# Patient Record
Sex: MALE | Race: WHITE | NOT HISPANIC OR LATINO | Employment: FULL TIME | URBAN - METROPOLITAN AREA
[De-identification: names, ages, dates, MRNs, and addresses within clinical notes are randomized per-mention and may not be internally consistent; named-entity substitution may affect disease eponyms.]

---

## 2023-08-15 ENCOUNTER — OFFICE VISIT (OUTPATIENT)
Dept: FAMILY MEDICINE CLINIC | Facility: CLINIC | Age: 23
End: 2023-08-15
Payer: COMMERCIAL

## 2023-08-15 VITALS
DIASTOLIC BLOOD PRESSURE: 66 MMHG | TEMPERATURE: 98.4 F | OXYGEN SATURATION: 97 % | HEART RATE: 72 BPM | HEIGHT: 73 IN | SYSTOLIC BLOOD PRESSURE: 116 MMHG | RESPIRATION RATE: 20 BRPM | WEIGHT: 226.6 LBS | BODY MASS INDEX: 30.03 KG/M2

## 2023-08-15 DIAGNOSIS — G47.33 OSA (OBSTRUCTIVE SLEEP APNEA): ICD-10-CM

## 2023-08-15 DIAGNOSIS — F33.1 MODERATE EPISODE OF RECURRENT MAJOR DEPRESSIVE DISORDER: Primary | ICD-10-CM

## 2023-08-15 DIAGNOSIS — E66.3 OVERWEIGHT (BMI 25.0-29.9): ICD-10-CM

## 2023-08-15 DIAGNOSIS — F90.9 ATTENTION DEFICIT HYPERACTIVITY DISORDER (ADHD), UNSPECIFIED ADHD TYPE: ICD-10-CM

## 2023-08-15 DIAGNOSIS — F41.9 ANXIETY: ICD-10-CM

## 2023-08-15 PROBLEM — F32.A DEPRESSION: Status: ACTIVE | Noted: 2023-08-15

## 2023-08-15 PROBLEM — R73.01 IMPAIRED FASTING GLUCOSE: Status: ACTIVE | Noted: 2018-05-15

## 2023-08-15 PROBLEM — R53.82 CHRONIC FATIGUE: Status: RESOLVED | Noted: 2018-05-15 | Resolved: 2023-08-15

## 2023-08-15 PROBLEM — R63.5 WEIGHT GAIN: Status: ACTIVE | Noted: 2018-05-15

## 2023-08-15 PROBLEM — R53.82 CHRONIC FATIGUE: Status: ACTIVE | Noted: 2018-05-15

## 2023-08-15 PROCEDURE — 99204 OFFICE O/P NEW MOD 45 MIN: CPT | Performed by: FAMILY MEDICINE

## 2023-08-15 RX ORDER — DESVENLAFAXINE 100 MG/1
100 TABLET, EXTENDED RELEASE ORAL DAILY
Qty: 90 TABLET | Refills: 3 | Status: SHIPPED | OUTPATIENT
Start: 2023-08-15

## 2023-08-15 RX ORDER — LURASIDONE HYDROCHLORIDE 20 MG/1
20 TABLET, FILM COATED ORAL
COMMUNITY
End: 2023-08-15 | Stop reason: ALTCHOICE

## 2023-08-15 RX ORDER — DESVENLAFAXINE 100 MG/1
100 TABLET, EXTENDED RELEASE ORAL
COMMUNITY
End: 2023-08-15 | Stop reason: SDUPTHER

## 2023-08-15 RX ORDER — DESVENLAFAXINE 100 MG/1
1 TABLET, EXTENDED RELEASE ORAL DAILY
COMMUNITY
Start: 2023-07-06 | End: 2023-08-15 | Stop reason: SDUPTHER

## 2023-08-15 RX ORDER — MULTIPLE VITAMINS W/ MINERALS TAB 9MG-400MCG
TAB ORAL
COMMUNITY
End: 2023-08-15

## 2023-08-15 RX ORDER — LAMOTRIGINE 200 MG/1
200 TABLET ORAL
COMMUNITY
End: 2023-08-15 | Stop reason: ALTCHOICE

## 2023-08-15 NOTE — ASSESSMENT & PLAN NOTE
Patient's (Body mass index is 29.87 kg/mý.) indicates that they are overweight with health conditions that include obstructive sleep apnea . Weight is improving with lifestyle modifications. BMI is is above average; BMI management plan is completed. We discussed low calorie, low carb based diet program, portion control, and increasing exercise.

## 2023-08-15 NOTE — PROGRESS NOTES
Devon Carroll is a 22 y.o. male who presents today to establish care.    Chief Complaint   Patient presents with    Establish Care    referral     For psychiatrist         Patient is here to establish care. His previous provider was in [place] and he moved here two months ago. He is being treated for anxeity, depression, and ADHD. He would like referral to get established with behavioral health here. He had had some worsening or depression and anxiety but it has been improving pristiq and change in jobs. He has been diagnosed with sleep apnea and would like to see sleep medicine to discuss a new device to help with sleep apnea as the CPAP mask made him throw up. He has no acute complaints today.        Review of Systems   Constitutional:  Negative for fever and unexpected weight loss.   HENT:  Negative for congestion, ear pain and sore throat.    Eyes:  Negative for visual disturbance.   Respiratory:  Negative for cough, shortness of breath and wheezing.    Cardiovascular:  Negative for chest pain and palpitations.   Gastrointestinal:  Negative for abdominal pain, blood in stool, constipation, diarrhea, nausea, vomiting and GERD.   Endocrine: Negative for polydipsia and polyuria.   Genitourinary:  Negative for difficulty urinating.   Musculoskeletal:  Negative for joint swelling.   Skin:  Negative for rash and skin lesions.   Allergic/Immunologic: Negative for environmental allergies.   Neurological:  Negative for seizures and syncope.   Hematological:  Does not bruise/bleed easily.   Psychiatric/Behavioral:  Negative for decreased concentration, dysphoric mood, sleep disturbance, suicidal ideas, negative for hyperactivity and depressed mood. The patient is not nervous/anxious.       PHQ-9 Depression Screening  Little interest or pleasure in doing things? 0-->not at all   Feeling down, depressed, or hopeless? 0-->not at all   Trouble falling or staying asleep, or sleeping too much?     Feeling tired or having little  energy?     Poor appetite or overeating?     Feeling bad about yourself - or that you are a failure or have let yourself or your family down?     Trouble concentrating on things, such as reading the newspaper or watching television?     Moving or speaking so slowly that other people could have noticed? Or the opposite - being so fidgety or restless that you have been moving around a lot more than usual?     Thoughts that you would be better off dead, or of hurting yourself in some way?     PHQ-9 Total Score 0   If you checked off any problems, how difficult have these problems made it for you to do your work, take care of things at home, or get along with other people?       ASHLEY-7  Over the last two weeks, how often have you been bothered by the following problems?  Feeling nervous, anxious or on edge: 1  Not being able to stop or control worryin  Worrying too much about different things: 0  Trouble Relaxin  Being so restless that it is hard to sit still: 0  Becoming easily annoyed or irritable: 0  Feeling afraid as if something awful might happen: 0  ASHLEY 7 Total Score: 3  If you checked any problems, how difficult have these problems made it for you to do your work, take care of things at home, or get along with other people: Not difficult at all      Past Medical History:   Diagnosis Date    ADHD (attention deficit hyperactivity disorder) 10/12/2005        Past Surgical History:   Procedure Laterality Date    CYST REMOVAL      MANDIBLE SURGERY  2021    to correct underbite    WISDOM TOOTH EXTRACTION      [id number]        Family History   Problem Relation Age of Onset    Irritable bowel syndrome Mother     Thyroid disease Mother     Other Father         congenital heart disease    Other Maternal Grandmother         unknown    Other Maternal Grandfather         unknown    Heart disease Paternal Grandmother     Arthritis Paternal Grandfather     Anxiety disorder Paternal Aunt     Bipolar disorder Maternal  "Aunt         Social History     Socioeconomic History    Marital status: Single   Tobacco Use    Smoking status: Never    Smokeless tobacco: Never   Substance and Sexual Activity    Alcohol use: Yes     Alcohol/week: 0.0 - 10.0 standard drinks     Comment: I drink socially. Not every week.    Drug use: Never    Sexual activity: Yes     Partners: Male     Birth control/protection: Same-sex partner, None     Comment: 2 sexual partners in the last year        Current Outpatient Medications on File Prior to Visit   Medication Sig Dispense Refill    [DISCONTINUED] desvenlafaxine (PRISTIQ) 100 MG 24 hr tablet Take 1 tablet by mouth Daily.      [DISCONTINUED] desvenlafaxine (PRISTIQ) 100 MG 24 hr tablet Take 1 tablet by mouth.      [DISCONTINUED] lamoTRIgine (LaMICtal) 200 MG tablet Take 1 tablet by mouth. (Patient not taking: Reported on 8/15/2023)      [DISCONTINUED] Lurasidone HCl (LATUDA) 20 MG tablet tablet Take 1 tablet by mouth. (Patient not taking: Reported on 8/15/2023)      [DISCONTINUED] multivitamin with minerals tablet tablet Take  by mouth. (Patient not taking: Reported on 8/15/2023)       No current facility-administered medications on file prior to visit.       No Known Allergies     Visit Vitals  /66 (BP Location: Right arm, Patient Position: Sitting)   Pulse 72   Temp 98.4 øF (36.9 øC) (Temporal)   Resp 20   Ht 185.5 cm (73.03\")   Wt 103 kg (226 lb 9.6 oz)   SpO2 97%   BMI 29.87 kg/mý      Body mass index is 29.87 kg/mý.    Physical Exam  Constitutional:       General: He is not in acute distress.     Appearance: He is well-developed. He is not diaphoretic.   HENT:      Head: Atraumatic.   Cardiovascular:      Rate and Rhythm: Normal rate and regular rhythm.      Heart sounds: Normal heart sounds. No murmur heard.    No friction rub. No gallop.   Pulmonary:      Effort: Pulmonary effort is normal. No respiratory distress.      Breath sounds: Normal breath sounds. No stridor. No wheezing, rhonchi or " rales.   Musculoskeletal:      Cervical back: Normal range of motion and neck supple.   Skin:     General: Skin is warm and dry.   Neurological:      Mental Status: He is alert and oriented to person, place, and time.   Psychiatric:         Behavior: Behavior normal.        No results found for this or any previous visit.     Problems Addressed this Visit          Endocrine and Metabolic    Overweight (BMI 25.0-29.9)     Patient's (Body mass index is 29.87 kg/mý.) indicates that they are overweight with health conditions that include obstructive sleep apnea . Weight is improving with lifestyle modifications. BMI is is above average; BMI management plan is completed. We discussed low calorie, low carb based diet program, portion control, and increasing exercise.             Mental Health    Depression - Primary     Patient's depression is recurrent and is moderate without psychosis. Their depression is currently in partial remission and the condition is improving with treatment. This will be reassessed in 3 months. F/U as described:patient will continue current medication therapy and was given referral to behavioral health.         Relevant Medications    desvenlafaxine (PRISTIQ) 100 MG 24 hr tablet    Other Relevant Orders    Ambulatory Referral to Behavioral Health    Anxiety     Chronic and stable.  Patient feels the symptoms are well controlled with Pristiq.  He will continue this medication.  Patient was given referral to behavioral health.         Relevant Medications    desvenlafaxine (PRISTIQ) 100 MG 24 hr tablet    Other Relevant Orders    Ambulatory Referral to Behavioral Health    ADHD (attention deficit hyperactivity disorder)     Chronic and stable.  Patient has not taken any medicine recently for ADHD control.  Patient was given referral to behavioral health for further evaluation and treatment.         Relevant Medications    desvenlafaxine (PRISTIQ) 100 MG 24 hr tablet    Other Relevant Orders     Ambulatory Referral to Behavioral Health       Sleep    NANI (obstructive sleep apnea)     Chronic.  Patient did not tolerate CPAP due to it causing him to vomit.  He would like to try another appliance to try to get control of sleep apnea.  Patient was given referral to sleep medicine for further evaluation and treatment.         Relevant Orders    Ambulatory Referral to Sleep Medicine     Diagnoses         Codes Comments    Moderate episode of recurrent major depressive disorder    -  Primary ICD-10-CM: F33.1  ICD-9-CM: 296.32     Anxiety     ICD-10-CM: F41.9  ICD-9-CM: 300.00     Attention deficit hyperactivity disorder (ADHD), unspecified ADHD type     ICD-10-CM: F90.9  ICD-9-CM: 314.01     NANI (obstructive sleep apnea)     ICD-10-CM: G47.33  ICD-9-CM: 327.23     Overweight (BMI 25.0-29.9)     ICD-10-CM: E66.3  ICD-9-CM: 278.02             Return in about 3 months (around 11/16/2023) for Annual.    Anibal Leach MD  8/15/2023

## 2023-08-15 NOTE — ASSESSMENT & PLAN NOTE
Chronic and stable.  Patient feels the symptoms are well controlled with Pristiq.  He will continue this medication.  Patient was given referral to behavioral health.

## 2023-08-15 NOTE — PATIENT INSTRUCTIONS

## 2023-08-15 NOTE — ASSESSMENT & PLAN NOTE
Chronic and stable.  Patient has not taken any medicine recently for ADHD control.  Patient was given referral to behavioral health for further evaluation and treatment.

## 2023-08-15 NOTE — ASSESSMENT & PLAN NOTE
Chronic.  Patient did not tolerate CPAP due to it causing him to vomit.  He would like to try another appliance to try to get control of sleep apnea.  Patient was given referral to sleep medicine for further evaluation and treatment.

## 2023-08-15 NOTE — ASSESSMENT & PLAN NOTE
Patient's depression is recurrent and is moderate without psychosis. Their depression is currently in partial remission and the condition is improving with treatment. This will be reassessed in 3 months. F/U as described:patient will continue current medication therapy and was given referral to behavioral health.

## 2023-09-25 ENCOUNTER — OFFICE VISIT (OUTPATIENT)
Age: 23
End: 2023-09-25

## 2023-09-25 VITALS
OXYGEN SATURATION: 99 % | DIASTOLIC BLOOD PRESSURE: 80 MMHG | HEART RATE: 56 BPM | SYSTOLIC BLOOD PRESSURE: 124 MMHG | HEIGHT: 73 IN | BODY MASS INDEX: 30.48 KG/M2 | WEIGHT: 230 LBS

## 2023-09-25 DIAGNOSIS — F41.1 GENERALIZED ANXIETY DISORDER: ICD-10-CM

## 2023-09-25 DIAGNOSIS — F33.1 MAJOR DEPRESSIVE DISORDER, RECURRENT EPISODE, MODERATE DEGREE: Primary | ICD-10-CM

## 2023-09-25 NOTE — PROGRESS NOTES
New Patient Office Visit      Date: 2023  Patient Name: Devon Carroll  : 2000   MRN: [id number]     Referring Provider: Anibal Leach MD    Chief Complaint:      ICD-10-CM ICD-9-CM   1. Major depressive disorder, recurrent episode, moderate degree  F33.1 296.32   2. Generalized anxiety disorder  F41.1 300.02        History of Present Illness:   Devon Carroll is a 22 y.o. male who is here today for intake and medication management.     Patient is seen and evaluated in the office.  He is well-groomed and appears to be in no acute distress.  He is calm and cooperative with evaluation.  He exhibits a linear and goal-directed thought process.  Patient states that he moved to [place] from [place] at the end of May of this year.  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  Patient states that although he enjoys his job, he has already had a lot of issues there with the culture of the company.  It seems as though this company is a toxic environment for him.  Patient is also not made many friends in [place].  All of his family and friends are back in [place].  He has been able to make a few visits to [place] to see friends and family, but is not enjoying living here so far.  Patient describes his mood as grumpy, irritated, and stressed if he does not take his Pristiq.  He states that he has been on Pristiq since high school.  Prior to starting Pristiq, he had taken several other medications that did not work for him.  He states that Pristiq has been fine.  Patient has followed up with a psychiatrist every 6 months to manage pression and anxiety.  Lately, he states that his mood has not been great, but attributes this mostly to situation.  He states that anxiety has increased due to issues at work.  Sleep is not good, but has improved with use of melatonin.  He states that he has been over eating, and has not been able to find the motivation to begin working out. He denies any  SI/intent/plan. Patient denies symptoms of kathy, auditory or visual hallucinations. He does not elicit any signs of psychosis. He denies paranoia or ideas of references.     Patient recently saw PCP who prescribed his Pristiq with several refills.  He denies need for any medication adjustments to address mood or anxiety today.  He is not interested in therapy as he feels as though therapy has never helped him in the past.  Patient will follow back up in 6 months.    Subjective      Review of Systems:   Review of Systems   Constitutional:  Negative for chills, fatigue and fever.   HENT:  Negative for congestion, hearing loss, sore throat and trouble swallowing.    Eyes:  Negative for blurred vision and double vision.   Respiratory:  Negative for cough, chest tightness and shortness of breath.    Cardiovascular:  Negative for chest pain and palpitations.   Gastrointestinal:  Negative for abdominal pain, constipation, diarrhea, nausea and vomiting.   Endocrine: Negative for polydipsia and polyuria.   Genitourinary:  Negative for hematuria and urgency.   Musculoskeletal:  Negative for arthralgias.   Skin:  Negative for skin lesions and bruise.   Neurological:  Negative for dizziness, tremors, seizures and light-headedness.   Hematological:  Negative for adenopathy.     Screening Scores:   PHQ-9 : 8  ASHLEY-7 : 4    Past Psychiatric History:   History of outpatient psychiatrist: In [place]   History of outpatient therapy: has had a few therapist in the past  Previous Inpatient hospitalizations: twice; depression/anxiety  Previous medication trials: Latuda (ineffective and caused weight gain), Strattera for ADHD: has tried others that didn't help  History of suicide/self harm attempts: denies     Abuse/trauma History:              Physical: denies              Sexual: denies              Emotional/Neglect: denies              Significant death/loss: denies              Other trauma: denies                Substance Abuse  "History:              Alcohol: denies              Tobacco/Vape: denies              Illicit Drugs: denies                Legal History:   denies     Social History:  Where was patient born: [place]   Where does patient currently live: [place]  Highest level of education obtained: College  Living situation: lives alone  Patient's Occupation: Landscape architecture   Leisure and Recreation: [employer]   Support system: in [place]  Oriental orthodox: denies     Family History:   Family History   Problem Relation Age of Onset    Irritable bowel syndrome Mother     Thyroid disease Mother     Other Father         congenital heart disease    Other Maternal Grandmother         unknown    Other Maternal Grandfather         unknown    Heart disease Paternal Grandmother     Arthritis Paternal Grandfather     Anxiety disorder Paternal Aunt     Bipolar disorder Maternal Aunt        Psychiatric History:   Psych Diagnosis: Paternal aunts and cousin with anxiety, Maternal aunt with bipolar  History of suicide/self harm attempts: denies  History of Substance abuse: denies      Past Medical History:   Past Medical History:   Diagnosis Date    ADHD (attention deficit hyperactivity disorder) 10/12/2005       Past Surgical History:   Past Surgical History:   Procedure Laterality Date    CYST REMOVAL      MANDIBLE SURGERY  08/2021    to correct underbite    WISDOM TOOTH EXTRACTION      [id number]       Medications:     Current Outpatient Medications:     desvenlafaxine (PRISTIQ) 100 MG 24 hr tablet, Take 1 tablet by mouth Daily., Disp: 90 tablet, Rfl: 3    Medication Considerations:  CONTRERAS reviewed and appropriate.      Allergies:   No Known Allergies      Objective     Physical Exam:  Vital Signs:   Vitals:    09/25/23 1303   BP: 124/80   Pulse: 56   SpO2: 99%   Weight: 104 kg (230 lb)   Height: 185.6 cm (73.07\")     Body mass index is 30.29 kg/m².     Mental Status Exam:   MENTAL STATUS EXAM   General Appearance:  Cleanly groomed " and dressed  Eye Contact:  Good eye contact  Attitude:  Cooperative  Motor Activity:  Normal gait, posture  Muscle Strength:  Normal  Speech:  Normal rate, tone, volume  Language:  Spontaneous  Mood and affect:  Anxious  Hopelessness:  5  Thought Process:  Logical and goal-directed  Associations/ Thought Content:  No delusions  Hallucinations:  None  Suicidal Ideations:  Not present  Homicidal Ideation:  Not present  Sensorium:  Alert  Orientation:  Person, place, time and situation  Immediate Recall, Recent, and Remote Memory:  Intact  Attention Span/ Concentration:  Good  Fund of Knowledge:  Appropriate for age and educational level  Intellectual Functioning:  Average range  Insight:  Fair  Judgement:  Fair  Reliability:  Fair  Impulse Control:  Fair     SUICIDE RISK ASSESSMENT/CSSRS:  1. Does patient have thoughts of suicide? denies  2. Does patient have intent for suicide? denies  3. Does patient have a current plan for suicide? denies  4. History of suicide attempts: denies  5. Family history of suicide or attempts: denies  6. History of violent behaviors towards others or property or thoughts of committing suicide: denies  7. History of sexual aggression toward others: denies  8. Access to firearms or weapons: denies    Assessment / Plan      Visit Diagnosis/Orders Placed This Visit:  Diagnoses and all orders for this visit:    1. Major depressive disorder, recurrent episode, moderate degree (Primary)  2. Generalized anxiety disorder  Continue Pristiq 100 mg po daily  Follow up with writer in 6 mo  Patient not interested in therapy at this time    Labs Reviewed : labs from 1/18/23 reviewed  Chart Reviewed     Functional Status: Mild impairment     Prognosis: Good with Ongoing Treatment     Impression/Formulation:  Patient appeared alert and oriented.  Patient is voluntarily requesting to begin outpatient treatment at [facility] [address].  Patient is receptive to assistance with  maintaining a stable lifestyle.  Patient presents with history of     ICD-10-CM ICD-9-CM   1. Major depressive disorder, recurrent episode, moderate degree  F33.1 296.32   2. Generalized anxiety disorder  F41.1 300.02   .     Treatment Plan:     Patient will continue supportive psychotherapy efforts and medications as indicated. Clinic will obtain release of information for current treatment team for continuity of care as needed. Patient will contact this office, call 911 or present to the nearest emergency room should suicidal or homicidal ideations occur. Discussed medication options and treatment plan of prescribed medication(s) as well as the risks, benefits, and potential side effects. Patient ackowledged and verbally consented to continue with current treatment plan and was educated on the importance of compliance with treatment and follow-up appointments.     Follow Up:   Return in about 6 months (around 3/25/2024) for Medication Management.    Quality Measures:   Tobacco: Patient denies use of tobacco and declines need for further education regarding use/cessation.    Brynn Tao MD   [facility] [address]     This is electronically signed by Brynn Tao MD  09/25/2023 12:52 EDT

## 2023-10-18 ENCOUNTER — TELEMEDICINE (OUTPATIENT)
Dept: SLEEP MEDICINE | Facility: CLINIC | Age: 23
End: 2023-10-18
Payer: COMMERCIAL

## 2023-10-18 VITALS — BODY MASS INDEX: 27.6 KG/M2 | WEIGHT: 222 LBS | HEIGHT: 75 IN

## 2023-10-18 DIAGNOSIS — R06.83 SNORING: ICD-10-CM

## 2023-10-18 DIAGNOSIS — G47.33 OSA (OBSTRUCTIVE SLEEP APNEA): Primary | ICD-10-CM

## 2023-10-18 PROCEDURE — 99203 OFFICE O/P NEW LOW 30 MIN: CPT | Performed by: NURSE PRACTITIONER

## 2023-10-18 NOTE — PROGRESS NOTES
Chief Complaint:   Chief Complaint   Patient presents with    Sleeping Problem       HPI:    Devon Carroll is a 22 y.o. male here to establish care.  Patient sees  as primary care.  Patient does have a history of ADHD, anxiety, depression and sleep apnea.    Patient states he was originally diagnosed in 2017 at a hospital close to [facility].  Patient states he does not have his report and the doctor has since retired.  We will try to get this if possible but likely need to repeat.  Patient has not worn his CPAP in over a year.  He does have symptoms of snoring and witnessed apneas.  Patient has no history of nasal fracture, hypnagogic hallucinations or sleep paralysis.  He did have a concussion as a child.  Patient states he stopped using as it was bulky, unattractive, and he has a single man.    During the weekday he will go to bed at 10:30 PM and awaken 7:20 AM.  Patient on the weekend going to bed at 1 AM awakening 10 AM.  He estimates getting 7 to 8 hours of sleep nightly.  He will go to sleep within 30 to 60 minutes.  Patient does feel rested upon awakening.  He does notice some movement in the night but nothing that will awaken him.  Patient has an Devens score of 2/24.    We did speak today about the consequences of untreated sleep apnea such as hypertension, atrial fibrillation, stroke, sudden cardiac death or early onset that dementia.  Also discussed different therapies available to him such as CPAP, MAD, or ENT referral.  Patient states he has had jaw surgery to correct an underbite in the past and is orthodontist is worried MAD would not be beneficial and may reposition his jaw.    Social history  This is a very pleasant 22-year-old male.  Patient is a non-smoker and will drink alcohol 2-3 times a week.  Patient denies illicit substance use.  He will usually have 1-2 regular tatiana daily.    Family History   Problem Relation Age of Onset    Irritable bowel syndrome Mother     Thyroid  disease Mother     Other Father         congenital heart disease    Other Maternal Grandmother         unknown    Other Maternal Grandfather         unknown    Heart disease Paternal Grandmother     Arthritis Paternal Grandfather     Anxiety disorder Paternal Aunt     Bipolar disorder Maternal Aunt      Past Surgical History:   Procedure Laterality Date    CYST REMOVAL      MANDIBLE SURGERY  08/2021    to correct underbite    WISDOM TOOTH EXTRACTION      [id number]         Current medications are:   Current Outpatient Medications:     desvenlafaxine (PRISTIQ) 100 MG 24 hr tablet, Take 1 tablet by mouth Daily., Disp: 90 tablet, Rfl: 3.      The patient's relevant past medical, surgical, family and social history were reviewed and updated in Epic as appropriate.       Review of Systems   Respiratory:  Positive for apnea.    Psychiatric/Behavioral:  Positive for decreased concentration, dysphoric mood and sleep disturbance. The patient is nervous/anxious.    All other systems reviewed and are negative.        Objective:    Physical Exam  Constitutional:       Appearance: Normal appearance.   HENT:      Head: Normocephalic and atraumatic.   Pulmonary:      Effort: Pulmonary effort is normal. No respiratory distress.   Neurological:      Mental Status: He is alert and oriented to person, place, and time.   Psychiatric:         Mood and Affect: Mood normal.         Behavior: Behavior normal.         Thought Content: Thought content normal.         Judgment: Judgment normal.             ASSESSMENT/PLAN    Diagnoses and all orders for this visit:    1. NANI (obstructive sleep apnea) (Primary)  -     Home Sleep Study; Future    2. Snoring  -     Home Sleep Study; Future        Counseled patient regarding multimodal approach with healthy nutrition, healthy sleep, regular physical activity, social activities, counseling, and medications. Encouraged to practice lateral sleep position. Avoid alcohol and sedatives close to  bedtime.  The patient's history of sleep apnea and consequences of untreated sleep apnea and her inability to get his testing we will repeat HST and follow-up as appropriate.  Patient gave verbal consent for video visit.      I have reviewed the results of my evaluation and impression and discussed my recommendations in detail with the patient.      Signed by  Akua Catherine, APRN    October 18, 2023      CC: Anibal Leach MD Koehler, Kendell D, MD

## 2023-12-04 ENCOUNTER — HOSPITAL ENCOUNTER (OUTPATIENT)
Dept: SLEEP MEDICINE | Facility: HOSPITAL | Age: 23
End: 2023-12-04
Payer: COMMERCIAL

## 2023-12-04 VITALS — HEIGHT: 75 IN | BODY MASS INDEX: 27.58 KG/M2 | WEIGHT: 221.78 LBS

## 2023-12-04 DIAGNOSIS — G47.33 OSA (OBSTRUCTIVE SLEEP APNEA): ICD-10-CM

## 2023-12-04 DIAGNOSIS — R06.83 SNORING: ICD-10-CM

## 2023-12-04 PROCEDURE — 95806 SLEEP STUDY UNATT&RESP EFFT: CPT

## 2023-12-06 DIAGNOSIS — G47.33 OSA (OBSTRUCTIVE SLEEP APNEA): Primary | ICD-10-CM

## 2023-12-06 DIAGNOSIS — R06.83 SNORING: ICD-10-CM

## 2023-12-13 ENCOUNTER — TELEMEDICINE (OUTPATIENT)
Dept: SLEEP MEDICINE | Facility: CLINIC | Age: 23
End: 2023-12-13
Payer: COMMERCIAL

## 2023-12-13 DIAGNOSIS — G47.33 OSA (OBSTRUCTIVE SLEEP APNEA): Primary | ICD-10-CM

## 2023-12-13 PROCEDURE — 99213 OFFICE O/P EST LOW 20 MIN: CPT | Performed by: NURSE PRACTITIONER

## 2023-12-13 NOTE — PROGRESS NOTES
Chief Complaint:   Chief Complaint   Patient presents with    Follow-up       HPI:    Devon Carroll is a 23 y.o. male here for follow-up of sleep study results.    Patient sees  as primary care.  Patient does have a history of ADHD, anxiety, depression and sleep apnea.  We did see him in consult 10/18/2023.     Patient stated he was originally diagnosed in 2017 at a hospital close to [facility].  Patient stated he does not have his report and the doctor has since retired.  We will try to get this if possible but likely need to repeat.  Patient has not worn his CPAP in over a year.  He does have symptoms of snoring and witnessed apneas.  Patient has no history of nasal fracture, hypnagogic hallucinations or sleep paralysis.  He did have a concussion as a child.  Patient states he stopped using as it was bulky, unattractive, and he has a single man.  Patient had a sleep study 12/4/2023 that did show an AHI of 11.4.  Patient states he does not wish to try CPAP again due to his age and single status.  This also caused difficulties with bloating and excess air.  Patient is interested in [facility] I will get an order mailed out to him today.       Current medications are:   Current Outpatient Medications:     desvenlafaxine (PRISTIQ) 100 MG 24 hr tablet, Take 1 tablet by mouth Daily., Disp: 90 tablet, Rfl: 3.      The patient's relevant past medical, surgical, family and social history were reviewed and updated in Epic as appropriate.       Review of Systems   Respiratory:  Positive for apnea.    Psychiatric/Behavioral:  Positive for dysphoric mood and sleep disturbance. The patient is nervous/anxious.    All other systems reviewed and are negative.        Objective:    Physical Exam  Constitutional:       Appearance: Normal appearance.   HENT:      Head: Normocephalic and atraumatic.   Pulmonary:      Effort: Pulmonary effort is normal. No respiratory distress.   Neurological:      Mental Status: He is alert  and oriented to person, place, and time.   Psychiatric:         Mood and Affect: Mood normal.         Behavior: Behavior normal.         Thought Content: Thought content normal.         Judgment: Judgment normal.           ASSESSMENT/PLAN    Diagnoses and all orders for this visit:    1. NANI (obstructive sleep apnea) (Primary)  -     Mandible Advancing [employer]        Counseled patient regarding multimodal approach with healthy nutrition, healthy sleep, regular physical activity, social activities, counseling, and medications. Encouraged to practice lateral sleep position. Avoid alcohol and sedatives close to bedtime.    I will do an order for MAT for the attendance of his choice I will mail this to him today and see him back in 4 months.  Patient agrees to this plan of care.  Patient is at home in [place] and gave consent for video visit.    I have reviewed the results of my evaluation and impression and discussed my recommendations in detail with the patient.      Signed by  STANTON Shepherd    December 13, 2023      CC: Anibal Leach MD         No ref. provider found

## 2023-12-18 ENCOUNTER — OFFICE VISIT (OUTPATIENT)
Age: 23
End: 2023-12-18
Payer: COMMERCIAL

## 2023-12-18 VITALS
WEIGHT: 232.2 LBS | OXYGEN SATURATION: 99 % | DIASTOLIC BLOOD PRESSURE: 92 MMHG | SYSTOLIC BLOOD PRESSURE: 140 MMHG | HEIGHT: 75 IN | HEART RATE: 69 BPM | BODY MASS INDEX: 28.87 KG/M2

## 2023-12-18 DIAGNOSIS — F41.1 GENERALIZED ANXIETY DISORDER: ICD-10-CM

## 2023-12-18 DIAGNOSIS — F33.1 MAJOR DEPRESSIVE DISORDER, RECURRENT EPISODE, MODERATE DEGREE: Primary | ICD-10-CM

## 2023-12-18 NOTE — PROGRESS NOTES
[facility]             Follow Up Office Visit      Date: 2023   Patient Name: Devon Carroll  : 2000   MRN: [id number]     Referring Provider: Anibal Leach MD    Chief Complaint:      ICD-10-CM ICD-9-CM   1. Major depressive disorder, recurrent episode, moderate degree  F33.1 296.32   2. Generalized anxiety disorder  F41.1 300.02        History of Present Illness:   Devon Carroll is a 23 y.o. male who is here today for follow up with MDD/ASHLEY.    Patient is seen and evaluated in the office.  He appears to be in no acute distress at this time.  He is calm and cooperative with evaluation, and exhibits a linear and goal-directed thought process.  Patient came into clinic last week after having a difficult time.  He was able to speak with therapist in clinic and scheduled a sooner appointment to follow-up with writer.  Patient speaks with writer regarding things that have been going on around that time.  He has been having a difficult time in a relationship, and found that his partner did not have his best interest in mind.  He has also continued to have a hard time at work due to it being a toxic environment and his direct boss being unfair and unforgiving.  Since last week, patient states that he was able to go back to [place] to visit his family.  After being able to regroup, patient states that he is feeling a little bit better.  He states that overall he feels as though Pristiq has been beneficial for him.  This past episode was mainly situational, patient does not feel as though it warrants any adjustments to medications at this time.  Patient has made plans to change his situation.  He is hopeful to find a job back in [place] area within the next few months, so that he can move back to be closer to family and friends.  We talked about this and using current situation to learn more about things that he does not want in a job or in a relationship in  "the future.  We talked about always working towards getting things in her life that we deserve and that are mentally good for us.  Writer encouraged patient to use last couple of months in [place] to enjoy things and experience new things.  Patient is agreeable.  He denies any suicidal ideation, plan, intent.  We will keep medications the same today, and follow-up in March.  If patient needs writer sooner, he will reach out the office and schedule sooner appointment.    Previous Medication Trials:  Latuda (ineffective and caused weight gain), Strattera for ADHD: has tried others that didn't help     Subjective      Review of Systems:   Review of Systems   Constitutional:  Negative for chills, fatigue and fever.   HENT:  Negative for congestion, hearing loss, sore throat and trouble swallowing.    Eyes:  Negative for blurred vision and double vision.   Respiratory:  Negative for cough, chest tightness and shortness of breath.    Cardiovascular:  Negative for chest pain and palpitations.   Gastrointestinal:  Negative for abdominal pain, constipation, diarrhea, nausea and vomiting.   Endocrine: Negative for polydipsia and polyuria.   Genitourinary:  Negative for hematuria and urgency.   Musculoskeletal:  Negative for arthralgias.   Skin:  Negative for skin lesions and bruise.   Neurological:  Negative for dizziness, tremors, seizures and light-headedness.   Hematological:  Negative for adenopathy.     Screening Scores:   PHQ-9 : 7  ASHLEY-7 : 3    Medications:     Current Outpatient Medications:     desvenlafaxine (PRISTIQ) 100 MG 24 hr tablet, Take 1 tablet by mouth Daily., Disp: 90 tablet, Rfl: 3    Medication Considerations:  CONTRERAS reviewed and appropriate.     Allergies:   No Known Allergies    Objective     Vital Signs:   Vitals:    12/18/23 1145   BP: 140/92   Pulse: 69   SpO2: 99%   Weight: 105 kg (232 lb 3.2 oz)   Height: 190.5 cm (75\")     Body mass index is 29.02 kg/m².     Mental Status Exam:   MENTAL STATUS " EXAM   General Appearance:  Cleanly groomed and dressed  Eye Contact:  Good eye contact  Attitude:  Cooperative  Motor Activity:  Normal gait, posture  Muscle Strength:  Normal  Speech:  Normal rate, tone, volume  Language:  Spontaneous  Mood and affect:  Normal, pleasant and appropriate  Hopelessness:  4  Thought Process:  Logical and goal-directed  Associations/ Thought Content:  No delusions  Hallucinations:  None  Suicidal Ideations:  Not present  Homicidal Ideation:  Not present  Sensorium:  Alert  Orientation:  Person, place, time and situation  Immediate Recall, Recent, and Remote Memory:  Intact  Attention Span/ Concentration:  Good  Fund of Knowledge:  Appropriate for age and educational level  Intellectual Functioning:  Average range  Insight:  Fair  Judgement:  Fair  Reliability:  Fair  Impulse Control:  Fair      SUICIDE RISK ASSESSMENT/CSSRS:  1. Does patient have thoughts of suicide? denies  2. Does patient have intent for suicide? denies  3. Does patient have a current plan for suicide? denies  4. History of suicide attempts: denies  5. Family history of suicide or attempts: denies  6. History of violent behaviors towards others or property or thoughts of committing suicide: denies  7. History of sexual aggression toward others: denies  8. Access to firearms or weapons: denies    Assessment / Plan      Visit Diagnosis/Orders Placed This Visit:    1. Major depressive disorder, recurrent episode, moderate degree (Primary)  2. Generalized anxiety disorder  Continue Pristiq 100 mg po daily  Follow up with writer in 6 mo  Patient not interested in therapy at this time     Labs Reviewed : labs from 1/18/23 reviewed  Chart Reviewed      Functional Status: Mild impairment      Prognosis: Good with Ongoing Treatment     Impression/Formulation:  Patient appeared alert and oriented.  Patient is voluntarily requesting to begin outpatient therapy at [facility].  Patient is receptive to  assistance with maintaining a stable lifestyle.  Patient presents with history of     ICD-10-CM ICD-9-CM   1. Major depressive disorder, recurrent episode, moderate degree  F33.1 296.32   2. Generalized anxiety disorder  F41.1 300.02     Treatment Plan:     Patient will continue supportive psychotherapy efforts and medications as indicated. Clinic will obtain release of information for current treatment team for continuity of care as needed. Patient will contact this office, call 911 or present to the nearest emergency room should suicidal or homicidal ideations occur.  Discussed medication options and treatment plan of prescribed medication(s) as well as the risks, benefits, and potential side effects. Patient ackowledged and verbally consented to continue with current treatment plan and was educated on the importance of compliance with treatment and follow-up appointments.     Quality Measures:  Tobacco: Patient denies use of tobacco and declines need for further education regarding use/cessation.     Follow Up:   Return in about 3 months (around 3/18/2024) for Medication Management, follow up with therapy.    Brynn Tao MD  [facility]     This is electronically signed by Brynn Tao MD   12/18/2023 11:56 EST

## 2024-01-09 ENCOUNTER — OFFICE VISIT (OUTPATIENT)
Dept: FAMILY MEDICINE CLINIC | Facility: CLINIC | Age: 24
End: 2024-01-09
Payer: COMMERCIAL

## 2024-01-09 VITALS
BODY MASS INDEX: 30.62 KG/M2 | TEMPERATURE: 97.7 F | SYSTOLIC BLOOD PRESSURE: 132 MMHG | HEIGHT: 73 IN | DIASTOLIC BLOOD PRESSURE: 78 MMHG | HEART RATE: 79 BPM | WEIGHT: 231 LBS | OXYGEN SATURATION: 100 %

## 2024-01-09 DIAGNOSIS — Z11.3 SCREENING EXAMINATION FOR STD (SEXUALLY TRANSMITTED DISEASE): ICD-10-CM

## 2024-01-09 DIAGNOSIS — Z23 FLU VACCINE NEED: ICD-10-CM

## 2024-01-09 DIAGNOSIS — Z00.00 WELL ADULT EXAM: Primary | ICD-10-CM

## 2024-01-09 DIAGNOSIS — Z23 NEED FOR HEPATITIS B VACCINATION: ICD-10-CM

## 2024-01-09 DIAGNOSIS — E66.09 CLASS 1 OBESITY DUE TO EXCESS CALORIES WITHOUT SERIOUS COMORBIDITY WITH BODY MASS INDEX (BMI) OF 30.0 TO 30.9 IN ADULT: ICD-10-CM

## 2024-01-09 DIAGNOSIS — Z23 NEED FOR TDAP VACCINATION: ICD-10-CM

## 2024-01-09 PROBLEM — E66.01 MORBID (SEVERE) OBESITY DUE TO EXCESS CALORIES: Status: ACTIVE | Noted: 2024-01-09

## 2024-01-09 PROCEDURE — 90471 IMMUNIZATION ADMIN: CPT | Performed by: FAMILY MEDICINE

## 2024-01-09 PROCEDURE — 90686 IIV4 VACC NO PRSV 0.5 ML IM: CPT | Performed by: FAMILY MEDICINE

## 2024-01-09 PROCEDURE — 90746 HEPB VACCINE 3 DOSE ADULT IM: CPT | Performed by: FAMILY MEDICINE

## 2024-01-09 PROCEDURE — 99395 PREV VISIT EST AGE 18-39: CPT | Performed by: FAMILY MEDICINE

## 2024-01-09 PROCEDURE — 90715 TDAP VACCINE 7 YRS/> IM: CPT | Performed by: FAMILY MEDICINE

## 2024-01-09 PROCEDURE — 90472 IMMUNIZATION ADMIN EACH ADD: CPT | Performed by: FAMILY MEDICINE

## 2024-01-09 NOTE — ASSESSMENT & PLAN NOTE
Patient's (Body mass index is 30.27 kg/m².) indicates that they are obese (BMI >30) with health conditions that include obstructive sleep apnea . Weight is worsening. BMI  is above average; BMI management plan is completed. We discussed low calorie, low carb based diet program, portion control, and increasing exercise.

## 2024-01-09 NOTE — LETTER
[facility]  Vaccine Consent Form    Patient Name:  Devon Carroll  Patient :  2000     Vaccine(s) Ordered    Fluzone (or Fluarix & Flulaval for VFC) >6mos  Tdap Vaccine => 6yo IM (BOOSTRIX)  Hepatitis B Vaccine Adult IM (ENERGIX/RECOMBIVAX)        Screening Checklist  The following questions should be completed prior to vaccination. If you answer “yes” to any question, it does not necessarily mean you should not be vaccinated. It just means we may need to clarify or ask more questions. If a question is unclear, please ask your healthcare provider to explain it.    Yes No   Any fever or moderate to severe illness today (mild illness and/or antibiotic treatment are not contraindications)?     Do you have a history of a serious reaction to any previous vaccinations, such as anaphylaxis, encephalopathy within 7 days, Guillain-Houston syndrome within 6 weeks, seizure?     Have you received any live vaccine(s) (e.g MMR, HELDER) or any other vaccines in the last month (to ensure duplicate doses aren't given)?     Do you have an anaphylactic allergy to latex (DTaP, DTaP-IPV, Hep A, Hep B, MenB, RV, Td, Tdap), baker’s yeast (Hep B, HPV), polysorbates (RSV, nirsevimab, PCV 20, Rotavirrus, Tdap, Shingrix), or gelatin (HELDER, MMR)?     Do you have an anaphylactic allergy to neomycin (Rabies, HELDER, MMR, IPV, Hep A), polymyxin B (IPV), or streptomycin (IPV)?      Any cancer, leukemia, AIDS, or other immune system disorder? (HELDER, MMR, RV)     Do you have a parent, brother, or sister with an immune system problem (if immune competence of vaccine recipient clinically verified, can proceed)? (MMR, HELDER)     Any recent steroid treatments for >2 weeks, chemotherapy, or radiation treatment? (HELDER, MMR)     Have you received antibody-containing blood transfusions or IVIG in the past 11 months (recommended interval is dependent on product)? (MMR, HELDER)     Have you taken antiviral drugs (acyclovir, famciclovir, valacyclovir for HELDER) in  "the last 24 or 48 hours, respectively?      Are you pregnant or planning to become pregnant within 1 month? (HELDER, MMR, HPV, IPV, MenB, Abrexvy; For Hep B- refer to Engerix-B; For RSV - Abrysvo is indicated for 32-36 weeks of pregnancy from September to January)     For infants, have you ever been told your child has had intussusception or a medical emergency involving obstruction of the intestine (Rotavirus)? If not for an infant, can skip this question.         *Ordering Physicians/APC should be consulted if \"yes\" is checked by the patient or guardian above.  I have received, read, and understand the Vaccine Information Statement (VIS) for each vaccine ordered.  I have considered my or my child's health status as well as the health status of my close contacts.  I have taken the opportunity to discuss my vaccine questions with my or my child's health care provider.   I have requested that the ordered vaccine(s) be given to me or my child.  I understand the benefits and risks of the vaccines.  I understand that I should remain in the clinic for 15 minutes after receiving the vaccine(s).  _________________________________________________________  Signature of Patient or Parent/Legal Guardian ____________________  Date     "

## 2024-01-09 NOTE — PATIENT INSTRUCTIONS

## 2024-01-09 NOTE — PROGRESS NOTES
Devon Carroll is a 23 y.o. male who presents today to complete annual exam.    Chief Complaint   Patient presents with    Annual Exam        Patient is here for annual exam. He has been following with behavioral health and has been taking pristiq as directed. He has follow-up appointments scheduled with them. He has been gaining weight since losing weight through diet and exercise. He has continued working out 3-4 times a week doing martial arts and going to the gym doing cardio and weight lifting. He eats a generally healthy diet. He has been trying to do meal prepping. He has pasta occasionally, he eats wraps, fruits, and vegetables for lunch. Dinner is protein, fruits, and veggies. He has been trying to cut back on soda. He does eat out 3-5 times a week. He is interested in getting his thyroid function checked. He sees the dentist twice a year. He sees optometrist annually. He does not see a dermatologist. He is sexually active and has had two male sexual partners in the last year with inconsistent condom use. He is asymptomatic but would like screening for HIV, syphilis, and hep C.          Review of Systems   Constitutional:  Negative for fever and unexpected weight loss.   HENT:  Negative for congestion, ear pain and sore throat.    Eyes:  Negative for visual disturbance.   Respiratory:  Negative for cough, shortness of breath and wheezing.    Cardiovascular:  Negative for chest pain and palpitations.   Gastrointestinal:  Negative for abdominal pain, blood in stool, constipation, diarrhea, nausea, vomiting and GERD.   Endocrine: Negative for polydipsia and polyuria.   Genitourinary:  Negative for difficulty urinating.   Musculoskeletal:  Negative for joint swelling.   Skin:  Negative for rash and skin lesions.   Allergic/Immunologic: Negative for environmental allergies.   Neurological:  Negative for seizures and syncope.   Hematological:  Does not bruise/bleed easily.   Psychiatric/Behavioral:  Positive for  decreased concentration, dysphoric mood and depressed mood. Negative for sleep disturbance and suicidal ideas.             12/18/2023    11:44 AM   PHQ-2/PHQ-9 Depression Screening   Little Interest or Pleasure in Doing Things 0-->not at all   Feeling Down, Depressed or Hopeless 1-->several days   Trouble Falling or Staying Asleep, or Sleeping Too Much 0-->not at all   Feeling Tired or Having Little Energy 1-->several days   Poor Appetite or Overeating 2-->more than half the days   Feeling Bad about Yourself - or that You are a Failure or Have Let Yourself or Your Family Down 2-->more than half the days   Trouble Concentrating on Things, Such as Reading the Newspaper or Watching Television 1-->several days   Moving or Speaking So Slowly that Other People Could Have Noticed? Or the Opposite - Being So Fidgety 0-->not at all   Thoughts that You Would be Better Off Dead or of Hurting Yourself in Some Way 0-->not at all   PHQ-9: Brief Depression Severity Measure Score 7   If You Checked Off Any Problems, How Difficult Have These Problems Made It For You to Do Your Work, Take Care of Things at Home, or Get Along with Other People? somewhat difficult         Past Medical History:   Diagnosis Date    ADHD (attention deficit hyperactivity disorder) 10/12/2005        Past Surgical History:   Procedure Laterality Date    CYST REMOVAL      MANDIBLE SURGERY  08/2021    to correct underbite    WISDOM TOOTH EXTRACTION      [id number]        Family History   Problem Relation Age of Onset    Irritable bowel syndrome Mother     Thyroid disease Mother     Other Father         congenital heart disease    Thyroid disease Maternal Grandmother     Other Maternal Grandfather         unknown    Heart disease Paternal Grandmother     Thyroid disease Paternal Grandmother     Arthritis Paternal Grandfather     Bipolar disorder Maternal Aunt     Anxiety disorder Paternal Aunt         Social History     Socioeconomic History    Marital status:  "Single   Tobacco Use    Smoking status: Never    Smokeless tobacco: Never   Vaping Use    Vaping Use: Never used   Substance and Sexual Activity    Alcohol use: Yes     Alcohol/week: 0.0 - 10.0 standard drinks of alcohol     Comment: I drink socially. Not every week.    Drug use: Never    Sexual activity: Yes     Partners: Male     Birth control/protection: Same-sex partner, None     Comment: 2 sexual partners in the last year        Current Outpatient Medications on File Prior to Visit   Medication Sig Dispense Refill    desvenlafaxine (PRISTIQ) 100 MG 24 hr tablet Take 1 tablet by mouth Daily. 90 tablet 3     No current facility-administered medications on file prior to visit.       No Known Allergies     Visit Vitals  /78   Pulse 79   Temp 97.7 °F (36.5 °C)   Ht 186.1 cm (73.25\")   Wt 105 kg (231 lb)   SpO2 100%   BMI 30.27 kg/m²      Body mass index is 30.27 kg/m².    Physical Exam  Constitutional:       General: He is not in acute distress.     Appearance: He is well-developed. He is not diaphoretic.   HENT:      Head: Atraumatic.   Cardiovascular:      Rate and Rhythm: Normal rate and regular rhythm.      Heart sounds: Normal heart sounds. No murmur heard.     No friction rub. No gallop.   Pulmonary:      Effort: Pulmonary effort is normal. No respiratory distress.      Breath sounds: Normal breath sounds. No stridor. No wheezing, rhonchi or rales.   Abdominal:      General: Bowel sounds are normal. There is no distension.      Palpations: Abdomen is soft. There is no mass.      Tenderness: There is no abdominal tenderness. There is no guarding or rebound.      Hernia: No hernia is present.   Musculoskeletal:      Cervical back: Normal range of motion and neck supple.   Skin:     General: Skin is warm and dry.   Neurological:      Mental Status: He is alert and oriented to person, place, and time.   Psychiatric:         Behavior: Behavior normal.          No results found for this or any previous visit. "     Problems Addressed this Visit          Endocrine and Metabolic    Class 1 obesity due to excess calories without serious comorbidity with body mass index (BMI) of 30.0 to 30.9 in adult     Patient's (Body mass index is 30.27 kg/m².) indicates that they are obese (BMI >30) with health conditions that include obstructive sleep apnea . Weight is worsening. BMI  is above average; BMI management plan is completed. We discussed low calorie, low carb based diet program, portion control, and increasing exercise.          Relevant Orders    Comprehensive Metabolic Panel    CBC & Differential    Lipid Panel    Hemoglobin A1c    TSH    T4, free    T3       Health Encounters    Well adult exam - Primary     The patient is here for health maintenance visit.  Currently, the patient consumes a unhealthy diet and has an adequate exercise regimen.  Screening lab work is ordered.  Immunizations were reviewed today.  Advice and education was given regarding nutrition, aerobic exercise, routine dental evaluations, routine eye exams, reproductive health, cardiovascular risk reduction, sunscreen use, self skin examination (annual dermatology evaluations) and seatbelt use (general overall safety).  Further recommendations will be given if needed after lab evaluation.  Annual wellness evaluation is recommended.           Relevant Orders    Comprehensive Metabolic Panel    CBC & Differential    Lipid Panel    Hemoglobin A1c    TSH    T4, free    T3    Hepatitis C Antibody    HIV-1 / O / 2 Ag / Antibody    RPR       Other    Screening examination for STD (sexually transmitted disease)    Relevant Orders    Hepatitis C Antibody    HIV-1 / O / 2 Ag / Antibody    RPR     Other Visit Diagnoses       Flu vaccine need        Relevant Orders    Fluzone (or Fluarix & Flulaval for VFC) >6mos (Completed)    Need for Tdap vaccination        Relevant Orders    Tdap Vaccine => 8yo IM (BOOSTRIX) (Completed)    Need for hepatitis B vaccination         Relevant Orders    Tdap Vaccine => 6yo IM (BOOSTRIX) (Completed)    Hepatitis B Vaccine Adult IM (ENERGIX/RECOMBIVAX) (Completed)          Diagnoses         Codes Comments    Well adult exam    -  Primary ICD-10-CM: Z00.00  ICD-9-CM: V70.0     Class 1 obesity due to excess calories without serious comorbidity with body mass index (BMI) of 30.0 to 30.9 in adult     ICD-10-CM: E66.09, Z68.30  ICD-9-CM: 278.00, V85.30     Flu vaccine need     ICD-10-CM: Z23  ICD-9-CM: V04.81     Need for Tdap vaccination     ICD-10-CM: Z23  ICD-9-CM: V06.1     Need for hepatitis B vaccination     ICD-10-CM: Z23  ICD-9-CM: V05.3     Screening examination for STD (sexually transmitted disease)     ICD-10-CM: Z11.3  ICD-9-CM: V74.5             Return in about 1 year (around 1/9/2025) for Annual.    Anibal Leach MD  1/9/2024

## 2024-01-09 NOTE — LETTER
ADVANCE PRACTICE EXAM & DAILY COMMUNICATION NOTE    Patient Active Problem List   Diagnosis     Apnea of prematurity     Feeding problem of      Liveborn, born in hospital, delivered by      Dichorionic diamniotic twin gestation       VITALS:  Temp:  [98.2  F (36.8  C)-98.4  F (36.9  C)] 98.4  F (36.9  C)  Pulse:  [] 101  Resp:  [31-70] 31  BP: (69-82)/(38-51) 80/39  FiO2 (%):  [21 %-26 %] 21 %  SpO2:  [63 %-100 %] 77 %    MEDS:   Current Facility-Administered Medications   Medication     Breast Milk label for barcode scanning 1 Bottle     cholecalciferol (D-VI-SOL, Vitamin D3) 10 mcg/mL (400 units/mL) liquid 5 mcg     ferrous sulfate (EDWIGE-IN-SOL) oral drops 6.5 mg     glycerin (PEDI-LAX) Suppository 0.25 suppository     hepatitis b vaccine recombinant (ENGERIX-B) injection 10 mcg     sucrose (SWEET-EASE) solution 0.2-2 mL       PHYSICAL EXAM:  Constitutional: Responsive.     Facies:  No dysmorphic features.  Head: Normocephalic. Anterior fontanelle soft, scalp clear.    Cardiovascular: Regular rate and rhythm. soft systolic murmur - audible today. Brisk capillary refill.  Respiratory: Breath sounds clear with good aeration bilaterally. No retractions or nasal flaring. Nasal cannula in place.  Gastrointestinal: Soft, non-tender, non-distended. No masses or hepatomegaly. Bowel sounds present and active.  : Normal female.  Musculoskeletal: Extremities normal - no gross deformities noted.  Skin: No suspicious lesions or rashes. No jaundice.  Neurologic: Normal suck. Tone normal and symmetric bilaterally.  No focal deficits.    Has had several self resolved desaturation spells today.  On nasal cannula 1/2 liter 21-26%.       PARENT COMMUNICATION:  Parents updated during rounds by  team.       LISA Jung- CNP, NNP 2021   Advanced Practice Service         [facility]  Vaccine Consent Form    Patient Name:  Devon Carroll  Patient :  2000     Vaccine(s) Ordered    Fluzone (or Fluarix & Flulaval for VFC) >6mos  Tdap Vaccine => 8yo IM (BOOSTRIX)        Screening Checklist  The following questions should be completed prior to vaccination. If you answer “yes” to any question, it does not necessarily mean you should not be vaccinated. It just means we may need to clarify or ask more questions. If a question is unclear, please ask your healthcare provider to explain it.    Yes No   Any fever or moderate to severe illness today (mild illness and/or antibiotic treatment are not contraindications)?     Do you have a history of a serious reaction to any previous vaccinations, such as anaphylaxis, encephalopathy within 7 days, Guillain-Chase syndrome within 6 weeks, seizure?     Have you received any live vaccine(s) (e.g MMR, HELDER) or any other vaccines in the last month (to ensure duplicate doses aren't given)?     Do you have an anaphylactic allergy to latex (DTaP, DTaP-IPV, Hep A, Hep B, MenB, RV, Td, Tdap), baker’s yeast (Hep B, HPV), polysorbates (RSV, nirsevimab, PCV 20, Rotavirrus, Tdap, Shingrix), or gelatin (HELDER, MMR)?     Do you have an anaphylactic allergy to neomycin (Rabies, HELDER, MMR, IPV, Hep A), polymyxin B (IPV), or streptomycin (IPV)?      Any cancer, leukemia, AIDS, or other immune system disorder? (HELDER, MMR, RV)     Do you have a parent, brother, or sister with an immune system problem (if immune competence of vaccine recipient clinically verified, can proceed)? (MMR, HELDER)     Any recent steroid treatments for >2 weeks, chemotherapy, or radiation treatment? (HELDER, MMR)     Have you received antibody-containing blood transfusions or IVIG in the past 11 months (recommended interval is dependent on product)? (MMR, HELDER)     Have you taken antiviral drugs (acyclovir, famciclovir, valacyclovir for HELDER) in the last 24 or 48 hours, respectively?      Are you  "pregnant or planning to become pregnant within 1 month? (HELDER, MMR, HPV, IPV, MenB, Abrexvy; For Hep B- refer to Engerix-B; For RSV - Abrysvo is indicated for 32-36 weeks of pregnancy from September to January)     For infants, have you ever been told your child has had intussusception or a medical emergency involving obstruction of the intestine (Rotavirus)? If not for an infant, can skip this question.         *Ordering Physicians/APC should be consulted if \"yes\" is checked by the patient or guardian above.  I have received, read, and understand the Vaccine Information Statement (VIS) for each vaccine ordered.  I have considered my or my child's health status as well as the health status of my close contacts.  I have taken the opportunity to discuss my vaccine questions with my or my child's health care provider.   I have requested that the ordered vaccine(s) be given to me or my child.  I understand the benefits and risks of the vaccines.  I understand that I should remain in the clinic for 15 minutes after receiving the vaccine(s).  _________________________________________________________  Signature of Patient or Parent/Legal Guardian ____________________  Date       "

## 2024-01-11 ENCOUNTER — LAB (OUTPATIENT)
Dept: LAB | Facility: HOSPITAL | Age: 24
End: 2024-01-11
Payer: COMMERCIAL

## 2024-01-11 DIAGNOSIS — Z11.3 SCREENING EXAMINATION FOR STD (SEXUALLY TRANSMITTED DISEASE): ICD-10-CM

## 2024-01-11 DIAGNOSIS — E66.09 CLASS 1 OBESITY DUE TO EXCESS CALORIES WITHOUT SERIOUS COMORBIDITY WITH BODY MASS INDEX (BMI) OF 30.0 TO 30.9 IN ADULT: ICD-10-CM

## 2024-01-11 DIAGNOSIS — Z00.00 WELL ADULT EXAM: ICD-10-CM

## 2024-01-11 LAB
ALBUMIN SERPL-MCNC: 4.6 G/DL (ref 3.5–5.2)
ALBUMIN/GLOB SERPL: 1.6 G/DL
ALP SERPL-CCNC: 69 U/L (ref 39–117)
ALT SERPL W P-5'-P-CCNC: 18 U/L (ref 1–41)
ANION GAP SERPL CALCULATED.3IONS-SCNC: 10.8 MMOL/L (ref 5–15)
AST SERPL-CCNC: 27 U/L (ref 1–40)
BASOPHILS # BLD AUTO: 0 10*3/MM3 (ref 0–0.2)
BASOPHILS NFR BLD AUTO: 0 % (ref 0–1.5)
BILIRUB SERPL-MCNC: 0.5 MG/DL (ref 0–1.2)
BUN SERPL-MCNC: 16 MG/DL (ref 6–20)
BUN/CREAT SERPL: 15.2 (ref 7–25)
CALCIUM SPEC-SCNC: 9.6 MG/DL (ref 8.6–10.5)
CHLORIDE SERPL-SCNC: 102 MMOL/L (ref 98–107)
CHOLEST SERPL-MCNC: 195 MG/DL (ref 0–200)
CO2 SERPL-SCNC: 26.2 MMOL/L (ref 22–29)
CREAT SERPL-MCNC: 1.05 MG/DL (ref 0.76–1.27)
DEPRECATED RDW RBC AUTO: 38.3 FL (ref 37–54)
EGFRCR SERPLBLD CKD-EPI 2021: 101 ML/MIN/1.73
EOSINOPHIL # BLD AUTO: 0.03 10*3/MM3 (ref 0–0.4)
EOSINOPHIL NFR BLD AUTO: 0.5 % (ref 0.3–6.2)
ERYTHROCYTE [DISTWIDTH] IN BLOOD BY AUTOMATED COUNT: 11.8 % (ref 12.3–15.4)
GLOBULIN UR ELPH-MCNC: 2.8 GM/DL
GLUCOSE SERPL-MCNC: 83 MG/DL (ref 65–99)
HBA1C MFR BLD: 4.8 % (ref 4.8–5.6)
HCT VFR BLD AUTO: 46.4 % (ref 37.5–51)
HCV AB SER DONR QL: NORMAL
HDLC SERPL-MCNC: 46 MG/DL (ref 40–60)
HGB BLD-MCNC: 15.7 G/DL (ref 13–17.7)
HIV 1+2 AB+HIV1 P24 AG SERPL QL IA: NORMAL
IMM GRANULOCYTES # BLD AUTO: 0.01 10*3/MM3 (ref 0–0.05)
IMM GRANULOCYTES NFR BLD AUTO: 0.2 % (ref 0–0.5)
LDLC SERPL CALC-MCNC: 133 MG/DL (ref 0–100)
LDLC/HDLC SERPL: 2.86 {RATIO}
LYMPHOCYTES # BLD AUTO: 2 10*3/MM3 (ref 0.7–3.1)
LYMPHOCYTES NFR BLD AUTO: 34.4 % (ref 19.6–45.3)
MCH RBC QN AUTO: 30.3 PG (ref 26.6–33)
MCHC RBC AUTO-ENTMCNC: 33.8 G/DL (ref 31.5–35.7)
MCV RBC AUTO: 89.4 FL (ref 79–97)
MONOCYTES # BLD AUTO: 0.59 10*3/MM3 (ref 0.1–0.9)
MONOCYTES NFR BLD AUTO: 10.2 % (ref 5–12)
NEUTROPHILS NFR BLD AUTO: 3.18 10*3/MM3 (ref 1.7–7)
NEUTROPHILS NFR BLD AUTO: 54.7 % (ref 42.7–76)
NRBC BLD AUTO-RTO: 0 /100 WBC (ref 0–0.2)
PLATELET # BLD AUTO: 242 10*3/MM3 (ref 140–450)
PMV BLD AUTO: 11.1 FL (ref 6–12)
POTASSIUM SERPL-SCNC: 4.7 MMOL/L (ref 3.5–5.2)
PROT SERPL-MCNC: 7.4 G/DL (ref 6–8.5)
RBC # BLD AUTO: 5.19 10*6/MM3 (ref 4.14–5.8)
RPR SER QL: NORMAL
SODIUM SERPL-SCNC: 139 MMOL/L (ref 136–145)
T3 SERPL-MCNC: 130 NG/DL (ref 80–200)
T4 FREE SERPL-MCNC: 1.35 NG/DL (ref 0.93–1.7)
TRIGL SERPL-MCNC: 88 MG/DL (ref 0–150)
TSH SERPL DL<=0.05 MIU/L-ACNC: 3.45 UIU/ML (ref 0.27–4.2)
VLDLC SERPL-MCNC: 16 MG/DL (ref 5–40)
WBC NRBC COR # BLD AUTO: 5.81 10*3/MM3 (ref 3.4–10.8)

## 2024-01-11 PROCEDURE — 84439 ASSAY OF FREE THYROXINE: CPT

## 2024-01-11 PROCEDURE — 86803 HEPATITIS C AB TEST: CPT

## 2024-01-11 PROCEDURE — G0432 EIA HIV-1/HIV-2 SCREEN: HCPCS

## 2024-01-11 PROCEDURE — 80061 LIPID PANEL: CPT

## 2024-01-11 PROCEDURE — 84480 ASSAY TRIIODOTHYRONINE (T3): CPT

## 2024-01-11 PROCEDURE — 85025 COMPLETE CBC W/AUTO DIFF WBC: CPT

## 2024-01-11 PROCEDURE — 84443 ASSAY THYROID STIM HORMONE: CPT

## 2024-01-11 PROCEDURE — 83036 HEMOGLOBIN GLYCOSYLATED A1C: CPT

## 2024-01-11 PROCEDURE — 80053 COMPREHEN METABOLIC PANEL: CPT

## 2024-01-11 PROCEDURE — 86592 SYPHILIS TEST NON-TREP QUAL: CPT

## 2024-01-11 PROCEDURE — 36415 COLL VENOUS BLD VENIPUNCTURE: CPT

## 2024-01-23 ENCOUNTER — TELEMEDICINE (OUTPATIENT)
Dept: PSYCHIATRY | Facility: CLINIC | Age: 24
End: 2024-01-23
Payer: COMMERCIAL

## 2024-01-23 DIAGNOSIS — F33.1 MODERATE EPISODE OF RECURRENT MAJOR DEPRESSIVE DISORDER: Primary | ICD-10-CM

## 2024-01-23 DIAGNOSIS — F41.9 ANXIETY: ICD-10-CM

## 2024-01-23 DIAGNOSIS — F90.9 ATTENTION DEFICIT HYPERACTIVITY DISORDER (ADHD), UNSPECIFIED ADHD TYPE: ICD-10-CM
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
## 2024-02-06 ENCOUNTER — TELEMEDICINE (OUTPATIENT)
Dept: PSYCHIATRY | Facility: CLINIC | Age: 24
End: 2024-02-06
Payer: COMMERCIAL

## 2024-02-06 DIAGNOSIS — F41.9 ANXIETY: ICD-10-CM

## 2024-02-06 DIAGNOSIS — F90.9 ATTENTION DEFICIT HYPERACTIVITY DISORDER (ADHD), UNSPECIFIED ADHD TYPE: ICD-10-CM

## 2024-02-06 DIAGNOSIS — F33.1 MODERATE EPISODE OF RECURRENT MAJOR DEPRESSIVE DISORDER: Primary | ICD-10-CM

## 2024-02-06 PROCEDURE — 90837 PSYTX W PT 60 MINUTES: CPT | Performed by: COUNSELOR

## 2024-02-06 NOTE — PROGRESS NOTES
Date: February 6, 2024  Time In: 4:30PM  Time Out: 5:30PM  This provider is located at [place], [place] for [facility] (through [facility]), [address], [place] [address] using a secure [employer] Video Visit through [employer]. Patient is being seen remotely via telehealth at home address in [place] and stated they are in a secure environment for this session. The patient's condition being diagnosed/treated is appropriate for telemedicine. The provider identified herself as well as her credentials. The patient, and/or patients guardian, consent to be seen remotely, and when consent is given they understand that the consent allows for patient identifiable information to be sent to a third party as needed. They may refuse to be seen remotely at any time. The electronic data is encrypted and password protected, and the patient and/or guardian has been advised of the potential risks to privacy not withstanding such measures.     You have chosen to receive care through a telehealth visit.  Do you consent to use a video/audio connection for your medical care today? Yes    PROGRESS NOTE  Data:  Devon Carroll is a 23 y.o. male who presents today for follow up    Chief Complaint: Depression and Anxiety    History of Present Illness: Patient reports fluctuating depression and anxiety through the week. Patient reports significant stress surrounding current position and management's attitude towards him which has reached a point of possibly resigning. Patient reports tension with manager who has directed employees to work more than 60 hours a week.  Patient reports having to change plans of traveling back home to see parents last minute due to having to work on the weekend.  Patient reports feeling significant pressure which causes him to make mistakes that impact his productivity.  Patient reports not being able to leave job immediately due to financial responsibilities and  therefore feels trapped.  Patient reports medications have not been working well to reduce symptoms.  Patient reports experiencing some isolation due to living in [facility].  Patient reports continuing to put in applications for new employment, finding ways to get rest and increase time away from work when possible.  Patient working towards short-term goals of making changes to morning routine, increasing sleep, improving diet, and working on improving self-care practices.      Clinical Maneuvering/Intervention: CBT, MI, Patient Centered    (Scales based on 0 - 10 with 10 being the worst)  Depression: 8 Anxiety: 8       Assisted patient in processing above session content; acknowledged and normalized patient’s thoughts, feelings, and concerns.  Rationalized patient thought process regarding recent stressors and life events. Discussed triggers associated with patient's emotions. Utilized CBT to process through and correct irrational cognitions with emphasis on  what is within control and not in our control . Also discussed coping skills for patient to implement. Discussed significant stress at current employer due to hostile management style and loneliness of being in the city away from family and friends.  Processed through thoughts and emotions surrounding dealing with pressure at work, decreasing self-care, and leaving a job in the correct manner.  Discussed continued work towards short-term goal setting, restructuring morning routine, finding ways to increase rest and self-care through the week.    Allowed patient to freely discuss issues without interruption or judgment. Provided safe, confidential environment to facilitate the development of positive therapeutic relationship and encourage open, honest communication. Assisted patient in identifying risk factors which would indicate the need for higher level of care including thoughts to harm self or others and/or self-harming behavior and encouraged  patient to contact this office, call 911, or present to the nearest emergency room should any of these events occur. Discussed crisis intervention services and means to access. Patient adamantly and convincingly denies current suicidal or homicidal ideation or perceptual disturbance.    Assessment:   Assessment   Patient appears to maintain relative stability as compared to their baseline.  However, patient continues to struggle with depression and anxiety which continues to cause impairment in important areas of functioning.  A result, they can be reasonably expected to continue to benefit from treatment and would likely be at increased risk for decompensation otherwise.    Mental Status Exam:   Hygiene:   good  Cooperation:  Cooperative  Eye Contact:  Good  Psychomotor Behavior:  Appropriate  Affect:  Full range  Mood: fluctates  Speech:  Normal  Thought Process:  Linear  Thought Content:  Mood congruent  Suicidal:  None  Homicidal:  None  Hallucinations:  None  Delusion:  None  Memory:  Intact  Orientation:  Person, Place, Time and Situation  Reliability:  fair  Insight:  Fair  Judgement:  Fair  Impulse Control:  Fair  Physical/Medical Issues:  No        Patient's Support Network Includes:  parents    Functional Status: Moderate impairment     Progress toward goal: Patient is working towards practicing the ABC's of CBT model while at home to process through and correct or improve cognitions. Patient is making progress on processing thoughts and emotions during sessions, utilizing positive thinking strategies, daily positive affirmations and self care practices.     Prognosis: Good with Ongoing Treatment            Plan:    Patient will continue in individual outpatient therapy with focus on improved functioning and coping skills, maintaining stability, and avoiding decompensation and the need for higher level of care.    Patient will adhere to medication regimen as prescribed and report any side effects. Patient  will contact this office, call 911 or present to the nearest emergency room should suicidal or homicidal ideations occur. Provide Cognitive Behavioral Therapy and Solution Focused Therapy to improve functioning, maintain stability, and avoid decompensation and the need for higher level of care.     Return in about 2 weeks, or earlier if symptoms worsen or fail to improve.           VISIT DIAGNOSIS:     ICD-10-CM ICD-9-CM   1. Moderate episode of recurrent major depressive disorder  F33.1 296.32   2. Anxiety  F41.9 300.00   3. Attention deficit hyperactivity disorder (ADHD), unspecified ADHD type  F90.9 314.01        Diagnoses and all orders for this visit:    1. Moderate episode of recurrent major depressive disorder (Primary)    2. Anxiety    3. Attention deficit hyperactivity disorder (ADHD), unspecified ADHD type           [facility] No Show Policy:  We understand unexpected circumstances arise; however, anytime you miss your appointment we are unable to provide you appropriate care.  In addition, each appointment missed could have been used to provide care for others.  We ask that you call at least 24 hours in advance to cancel or reschedule an appointment.  We would like to take this opportunity to remind you of our policy stating patients who miss THREE or more appointments without cancelling or rescheduling 24 hours in advance of the appointment may be subject to cancellation of any further visits with our clinic and recommendation to seek in-person services/visits.    Please call [phone] to reschedule your appointment. If there are reasons that make it difficult for you to keep the appointments, please call and let us know how we can help.  Please understand that medication prescribing will not continue without seeing your provider.      [facility]'s No Show Policy reviewed with patient at today's visit. Patient verbalized understanding of this policy.  Discussed with patient that in the event that there are three or more no show visits, it will be recommended that they pursue in-person services/visits as noncompliance with telehealth visits indicates that patient is not an appropriate candidate for telemedicine and would likely be more appropriate for in-person services/visits. Patient verbalizes understanding and is agreeable to this.       This document has been electronically signed by Polo Law III, LCSW  February 6, 2024 16:59 EST      Part of this note may be an electronic transcription/translation of spoken language to printed text using the Dragon Dictation System.

## 2024-02-21 ENCOUNTER — TELEMEDICINE (OUTPATIENT)
Dept: PSYCHIATRY | Facility: CLINIC | Age: 24
End: 2024-02-21
Payer: COMMERCIAL

## 2024-02-21 DIAGNOSIS — F41.9 ANXIETY: ICD-10-CM

## 2024-02-21 DIAGNOSIS — F33.1 MODERATE EPISODE OF RECURRENT MAJOR DEPRESSIVE DISORDER: Primary | ICD-10-CM

## 2024-02-21 DIAGNOSIS — F90.9 ATTENTION DEFICIT HYPERACTIVITY DISORDER (ADHD), UNSPECIFIED ADHD TYPE: ICD-10-CM

## 2024-02-21 NOTE — PROGRESS NOTES
Date: February 21, 2024  Time In: 12:30PM  Time Out: 1:30PM  This provider is located at [place], [place] for [facility] (through [facility]), [address], [place] [address] using a secure [employer] Video Visit through [employer]. Patient is being seen remotely via telehealth at home address in [place] and stated they are in a secure environment for this session. The patient's condition being diagnosed/treated is appropriate for telemedicine. The provider identified herself as well as her credentials. The patient, and/or patients guardian, consent to be seen remotely, and when consent is given they understand that the consent allows for patient identifiable information to be sent to a third party as needed. They may refuse to be seen remotely at any time. The electronic data is encrypted and password protected, and the patient and/or guardian has been advised of the potential risks to privacy not withstanding such measures.     You have chosen to receive care through a telehealth visit.  Do you consent to use a video/audio connection for your medical care today? Yes    PROGRESS NOTE  Data:  Devon Carroll is a 23 y.o. male who presents today for follow up    Chief Complaint: Depression, Anxiety, ADHD    History of Present Illness: Patient reports fluctuating anxiety and depression through the week. Patient reports work related stress has been manageable and he was able to complete all tasks required this week. Patient reports his parents came into town and he was able to spend time with family which was helpful to improve mood. Patient reports finding out about new job posting at a company that he already knows people. Patient reports feeling under pressure in current position and identified fear of starting over in a new city. Patient reports difficulty meeting new people socially in [place], [place] and has felt lonely following breakup. Patient reports being hurt from  last relationships and difficulty connecting with someone that has similarities in current community.  Patient reports fear and uncertainty over possibly fitting in to new social environment if he moved for work.  Patient identified time when he felt the best and most positive following graduating from college.  Patient is working towards completing all required tasks at work, continuing interviewing process, identifying and documenting habits that he was engaging in that were present during positive time of life.      Clinical Maneuvering/Intervention: CBT, MI, Patient Centered    (Scales based on 0 - 10 with 10 being the worst)  Depression: 6 Anxiety: 8       Assisted patient in processing above session content; acknowledged and normalized patient’s thoughts, feelings, and concerns.  Rationalized patient thought process regarding recent stressors and life events. Discussed triggers associated with patient's emotions. Utilized CBT to process through and correct irrational cognitions with emphasis on  importance of building up self esteem and improving self talk . Also discussed coping skills for patient to implement. Discussed work-related stress and possible new employment as well as past relationships ending and uncertainty.  Processed through thoughts and emotions surrounding difficulty fitting into social groups, relationships, feeling under appreciated, and uncertainty of the future. Discussed continued work towards utilizing coping skills, engaging in circular breathing and grounding techniques when feeling stressed or overwhelmed, engaging in positive thinking strategies, journaling thoughts and emotions,  identifying habits when he was feeling his best,  and finding ways to increase self care practices.     Allowed patient to freely discuss issues without interruption or judgment. Provided safe, confidential environment to facilitate the development of positive therapeutic relationship and encourage open,  honest communication. Assisted patient in identifying risk factors which would indicate the need for higher level of care including thoughts to harm self or others and/or self-harming behavior and encouraged patient to contact this office, call 911, or present to the nearest emergency room should any of these events occur. Discussed crisis intervention services and means to access. Patient adamantly and convincingly denies current suicidal or homicidal ideation or perceptual disturbance.    Assessment:   Assessment   Patient appears to maintain relative stability as compared to their baseline.  However, patient continues to struggle with anxiety, depression, ADHD which continues to cause impairment in important areas of functioning.  A result, they can be reasonably expected to continue to benefit from treatment and would likely be at increased risk for decompensation otherwise.    Mental Status Exam:   Hygiene:   good  Cooperation:  Cooperative  Eye Contact:  Good  Psychomotor Behavior:  Appropriate  Affect:  Full range  Mood: anxious and fluctates  Speech:  Normal  Thought Process:  Linear  Thought Content:  Mood congruent  Suicidal:  None  Homicidal:  None  Hallucinations:  None  Delusion:  None  Memory:  Intact  Orientation:  Person, Place, Time and Situation  Reliability:  fair  Insight:  Fair  Judgement:  Fair  Impulse Control:  Fair  Physical/Medical Issues:  No        Patient's Support Network Includes:  parents and extended family    Functional Status: Mild impairment     Progress toward goal: Patient is working towards practicing the ABC's of CBT model while at home to process through and correct or improve cognitions. Patient is making progress on processing thoughts and emotions during sessions, journaling, utilizing positive thinking strategies, engaging in breathing and grounding skills, daily positive affirmations and self care practices.     Prognosis: Good with Ongoing Treatment             Plan:    Patient will continue in individual outpatient therapy with focus on improved functioning and coping skills, maintaining stability, and avoiding decompensation and the need for higher level of care.    Patient will adhere to medication regimen as prescribed and report any side effects. Patient will contact this office, call 911 or present to the nearest emergency room should suicidal or homicidal ideations occur. Provide Cognitive Behavioral Therapy and Solution Focused Therapy to improve functioning, maintain stability, and avoid decompensation and the need for higher level of care.     Return in about 2 weeks, or earlier if symptoms worsen or fail to improve.           VISIT DIAGNOSIS:     ICD-10-CM ICD-9-CM   1. Moderate episode of recurrent major depressive disorder  F33.1 296.32   2. Anxiety  F41.9 300.00   3. Attention deficit hyperactivity disorder (ADHD), unspecified ADHD type  F90.9 314.01        Diagnoses and all orders for this visit:    1. Moderate episode of recurrent major depressive disorder (Primary)    2. Anxiety    3. Attention deficit hyperactivity disorder (ADHD), unspecified ADHD type           [facility] No Show Policy:  We understand unexpected circumstances arise; however, anytime you miss your appointment we are unable to provide you appropriate care.  In addition, each appointment missed could have been used to provide care for others.  We ask that you call at least 24 hours in advance to cancel or reschedule an appointment.  We would like to take this opportunity to remind you of our policy stating patients who miss THREE or more appointments without cancelling or rescheduling 24 hours in advance of the appointment may be subject to cancellation of any further visits with our clinic and recommendation to seek in-person services/visits.    Please call [phone] to reschedule your appointment. If there are reasons that make it difficult for you to keep  the appointments, please call and let us know how we can help.  Please understand that medication prescribing will not continue without seeing your provider.      [facility]'s No Show Policy reviewed with patient at today's visit. Patient verbalized understanding of this policy. Discussed with patient that in the event that there are three or more no show visits, it will be recommended that they pursue in-person services/visits as noncompliance with telehealth visits indicates that patient is not an appropriate candidate for telemedicine and would likely be more appropriate for in-person services/visits. Patient verbalizes understanding and is agreeable to this.       This document has been electronically signed by Polo Law III, RACQUEL  February 21, 2024 19:24 EST      Part of this note may be an electronic transcription/translation of spoken language to printed text using the Dragon Dictation System.

## 2024-03-07 ENCOUNTER — TELEMEDICINE (OUTPATIENT)
Dept: PSYCHIATRY | Facility: CLINIC | Age: 24
End: 2024-03-07
Payer: COMMERCIAL

## 2024-03-07 DIAGNOSIS — F33.1 MODERATE EPISODE OF RECURRENT MAJOR DEPRESSIVE DISORDER: Primary | ICD-10-CM

## 2024-03-07 DIAGNOSIS — F41.9 ANXIETY: ICD-10-CM

## 2024-03-07 NOTE — PROGRESS NOTES
Date: March 7, 2024  Time In: 4:30PM  Time Out: 5:15PM  This provider is located at [place], [place] for [facility] (through [facility]), [address], [place] [address] using a secure App[employer]ling[employer]art Video Visit through [employer]. Patient is being seen remotely via telehealth at home address in [place] and stated they are in a secure environment for this session. The patient's condition being diagnosed/treated is appropriate for telemedicine. The provider identified herself as well as her credentials. The patient, and/or patients guardian, consent to be seen remotely, and when consent is given they understand that the consent allows for patient identifiable information to be sent to a third party as needed. They may refuse to be seen remotely at any time. The electronic data is encrypted and password protected, and the patient and/or guardian has been advised of the potential risks to privacy not withstanding such measures.     You have chosen to receive care through a telehealth visit.  Do you consent to use a video/audio connection for your medical care today? Yes    PROGRESS NOTE  Data:  Devon Carroll is a 23 y.o. male who presents today for follow up    Chief Complaint: Anxiety, Depression, ADHD    History of Present Illness: Patient reports fluctuating anxiety and depression through the week. 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   Patient identified limited support and friendships in [place], [place]. Patient reports applying for multiple employers and is waiting for communication back to begin interviewing for a position in [place].  Patient reports recently being able to manage work-related tasks and regulate emotions better at work.  Patient reports identifying minor mistakes being made and previous attempts on projects and making adjustments to the way he  conducts planning. Patient reports restarting at [employer] again after a break which has been healthy outlet to relieve stress. Patient identified stress and anxiety eating as well as consuming fast food due to getting off work late.  Patient reports current medications appear to be working to reduce symptoms and stabilize mood. Patient is working towards short term goal setting, engaging in self awareness exercises,  improving overall diet , and finding ways to increase  exercise through the week .     Clinical Maneuvering/Intervention: CBT, MI, Patient Centered    (Scales based on 0 - 10 with 10 being the worst)  Depression: 6 Anxiety: 7       Assisted patient in processing above session content; acknowledged and normalized patient’s thoughts, feelings, and concerns.  Rationalized patient thought process regarding recent stressors and life events. Discussed triggers associated with patient's emotions. Utilized CBT to process through and correct irrational cognitions with emphasis on  importance of setting personal boundaries with self and others . Also discussed coping skills for patient to implement. Discussed recent interview search for new employment and working towards making lifestyle changes.  Processed her thoughts and emotions surrounding loneliness and limited friendship, unhealthy eating habits, and finding healthy outlets to reduce stress.  Discussed continued work towards utilizing coping skills, engaging in circular breathing and grounding techniques when feeling stressed or overwhelmed, engaging in positive thinking strategies, journaling thoughts and emotions, improve overall diet and finding ways to increase self care practices.     Allowed patient to freely discuss issues without interruption or judgment. Provided safe, confidential environment to facilitate the development of positive therapeutic relationship and encourage open, honest communication. Assisted patient in identifying risk factors  which would indicate the need for higher level of care including thoughts to harm self or others and/or self-harming behavior and encouraged patient to contact this office, call 911, or present to the nearest emergency room should any of these events occur. Discussed crisis intervention services and means to access. Patient adamantly and convincingly denies current suicidal or homicidal ideation or perceptual disturbance.    Assessment:   Assessment   Patient appears to maintain relative stability as compared to their baseline.  However, patient continues to struggle with depression and anxiety which continues to cause impairment in important areas of functioning.  A result, they can be reasonably expected to continue to benefit from treatment and would likely be at increased risk for decompensation otherwise.    Mental Status Exam:   Hygiene:   good  Cooperation:  Cooperative  Eye Contact:  Good  Psychomotor Behavior:  Appropriate  Affect:  Full range  Mood: fluctates  Speech:  Normal  Thought Process:  Linear  Thought Content:  Mood congruent  Suicidal:  None  Homicidal:  None  Hallucinations:  None  Delusion:  None  Memory:  Intact  Orientation:  Person, Place, Time and Situation  Reliability:  fair  Insight:  Fair  Judgement:  Fair  Impulse Control:  Fair  Physical/Medical Issues:  No        Patient's Support Network Includes:  parents    Functional Status:  Mild  impairment     Progress toward goal: Patient is working towards practicing the ABC's of CBT model while at home to process through and correct or improve cognitions. Patient is making progress on processing thoughts and emotions during sessions, journaling, utilizing positive thinking strategies, engaging in breathing and grounding skills, daily positive affirmations and self care practices.     Prognosis: Good with Ongoing Treatment            Plan:    Patient will continue in individual outpatient therapy with focus on improved functioning and coping  skills, maintaining stability, and avoiding decompensation and the need for higher level of care.    Patient will adhere to medication regimen as prescribed and report any side effects. Patient will contact this office, call 911 or present to the nearest emergency room should suicidal or homicidal ideations occur. Provide Cognitive Behavioral Therapy and Solution Focused Therapy to improve functioning, maintain stability, and avoid decompensation and the need for higher level of care.     Return in about 3 weeks, or earlier if symptoms worsen or fail to improve.           VISIT DIAGNOSIS:     ICD-10-CM ICD-9-CM   1. Moderate episode of recurrent major depressive disorder  F33.1 296.32   2. Anxiety  F41.9 300.00        Diagnoses and all orders for this visit:    1. Moderate episode of recurrent major depressive disorder (Primary)    2. Anxiety           [facility] No Show Policy:  We understand unexpected circumstances arise; however, anytime you miss your appointment we are unable to provide you appropriate care.  In addition, each appointment missed could have been used to provide care for others.  We ask that you call at least 24 hours in advance to cancel or reschedule an appointment.  We would like to take this opportunity to remind you of our policy stating patients who miss THREE or more appointments without cancelling or rescheduling 24 hours in advance of the appointment may be subject to cancellation of any further visits with our clinic and recommendation to seek in-person services/visits.    Please call [phone] to reschedule your appointment. If there are reasons that make it difficult for you to keep the appointments, please call and let us know how we can help.  Please understand that medication prescribing will not continue without seeing your provider.      [facility]'s No Show Policy reviewed with patient at today's visit. Patient verbalized  understanding of this policy. Discussed with patient that in the event that there are three or more no show visits, it will be recommended that they pursue in-person services/visits as noncompliance with telehealth visits indicates that patient is not an appropriate candidate for telemedicine and would likely be more appropriate for in-person services/visits. Patient verbalizes understanding and is agreeable to this.       This document has been electronically signed by Polo Law III, LCSW  March 7, 2024 18:32 EST      Part of this note may be an electronic transcription/translation of spoken language to printed text using the Dragon Dictation System.

## 2024-03-20 ENCOUNTER — TELEMEDICINE (OUTPATIENT)
Dept: PSYCHIATRY | Facility: CLINIC | Age: 24
End: 2024-03-20
Payer: COMMERCIAL

## 2024-03-20 DIAGNOSIS — F33.1 MODERATE EPISODE OF RECURRENT MAJOR DEPRESSIVE DISORDER: Primary | ICD-10-CM

## 2024-03-20 DIAGNOSIS — F90.9 ATTENTION DEFICIT HYPERACTIVITY DISORDER (ADHD), UNSPECIFIED ADHD TYPE: ICD-10-CM

## 2024-03-20 DIAGNOSIS — F41.9 ANXIETY: ICD-10-CM

## 2024-03-20 NOTE — PROGRESS NOTES
Date: March 20, 2024  Time In: 4:30PM  Time Out: 5:30PM  This provider is located at [place], [place] for [facility] (through [facility]), [address], [place] [address] using a secure [employer] Video Visit through [employer]. Patient is being seen remotely via telehealth at home address in [place] and stated they are in a secure environment for this session. The patient's condition being diagnosed/treated is appropriate for telemedicine. The provider identified herself as well as her credentials. The patient, and/or patients guardian, consent to be seen remotely, and when consent is given they understand that the consent allows for patient identifiable information to be sent to a third party as needed. They may refuse to be seen remotely at any time. The electronic data is encrypted and password protected, and the patient and/or guardian has been advised of the potential risks to privacy not withstanding such measures.     You have chosen to receive care through a telehealth visit.  Do you consent to use a video/audio connection for your medical care today? Yes    PROGRESS NOTE  Data:  Devon Carroll is a 23 y.o. male who presents today for follow up    Chief Complaint: Anxiety, Depression, ADHD    History of Present Illness: Patient reports fluctuating anxiety and depression through the week. Patient reports work related stress due to bosses demands for working overtime on the weekend. Patient reports anxiety and feeling on edge when around boss or when being giving unrealistic guidelines. Patient reports experiencing low self worth when being yelled at by management and identified not working well under authoritarian management style. Patient reports feeling like walking on egg shells because he needs to work under licensed individual  Patient reports staying late at work most days and having plans to go home to visit his parents which has been several weeks since  he has been able to spend time with them since moving to [place], [place]. Patient reports still feeling socially isolated and wanting to see extended family. Patient reports applying for multiple positions with limited  responses back from employers. Patient reports lease is ending in June and was told that he would need to make a decisions on resigning lease or moving. Patient reports feeling trapped in current situation and in current employment but has been refocusing on increasing self-care to build stress tolerance.  Patient reports engaging in prosocial activities, going to [employer] classes, and focusing on losing weight. Patient reports current medications appear to be working to reduce symptoms and stabilize mood. Patient is working towards short term goal setting, engaging in self awareness exercises,  limiting fast food , and finding ways to increase  exercise .     Clinical Maneuvering/Intervention: CBT, MI, Patient Centered    (Scales based on 0 - 10 with 10 being the worst)  Depression: 7 Anxiety: 5       Assisted patient in processing above session content; acknowledged and normalized patient’s thoughts, feelings, and concerns.  Rationalized patient thought process regarding recent stressors and life events. Discussed triggers associated with patient's emotions. Utilized CBT to process through and correct irrational cognitions with emphasis on negative thinking patterns verse positive thinking strategies. Also discussed coping skills for patient to implement. Discussed current stress due to management at current employer and having to make decisions on renewing lease or moving out as well as having limited responses to recent applications.  Processed through thoughts and emotions surrounding feeling trapped, place ship dynamics, and frustration with her current work environment.  Discussed continued work towards utilizing coping skills, engaging in circular breathing and grounding techniques when feeling  stressed or overwhelmed, engaging in positive thinking strategies, journaling thoughts and emotions, continuing jujitsu and finding ways to increase self care practices.     Allowed patient to freely discuss issues without interruption or judgment. Provided safe, confidential environment to facilitate the development of positive therapeutic relationship and encourage open, honest communication. Assisted patient in identifying risk factors which would indicate the need for higher level of care including thoughts to harm self or others and/or self-harming behavior and encouraged patient to contact this office, call 911, or present to the nearest emergency room should any of these events occur. Discussed crisis intervention services and means to access. Patient adamantly and convincingly denies current suicidal or homicidal ideation or perceptual disturbance.    Assessment:   Assessment   Patient appears to maintain relative stability as compared to their baseline.  However, patient continues to struggle with depression and anxiety which continues to cause impairment in important areas of functioning.  A result, they can be reasonably expected to continue to benefit from treatment and would likely be at increased risk for decompensation otherwise.    Mental Status Exam:   Hygiene:   good  Cooperation:  Cooperative  Eye Contact:  Good  Psychomotor Behavior:  Appropriate  Affect:  Full range  Mood: fluctates  Speech:  Normal  Thought Process:  Linear  Thought Content:  Mood congruent  Suicidal:  None  Homicidal:  None  Hallucinations:  None  Delusion:  None  Memory:  Intact  Orientation:  Person, Place, Time and Situation  Reliability:  fair  Insight:  Fair  Judgement:  Fair  Impulse Control:  Fair  Physical/Medical Issues:  No        Patient's Support Network Includes:  parents and extended family    Functional Status:  Moderate  impairment     Progress toward goal: Patient is working towards practicing the ABC's of CBT  model while at home to process through and correct or improve cognitions. Patient is making progress on processing thoughts and emotions during sessions, journaling, utilizing positive thinking strategies, engaging in breathing and grounding skills, daily positive affirmations and self care practices.     Prognosis: Good with Ongoing Treatment            Plan:    Patient will continue in individual outpatient therapy with focus on improved functioning and coping skills, maintaining stability, and avoiding decompensation and the need for higher level of care.    Patient will adhere to medication regimen as prescribed and report any side effects. Patient will contact this office, call 911 or present to the nearest emergency room should suicidal or homicidal ideations occur. Provide Cognitive Behavioral Therapy and Solution Focused Therapy to improve functioning, maintain stability, and avoid decompensation and the need for higher level of care.     Return in about 2 weeks, or earlier if symptoms worsen or fail to improve.           VISIT DIAGNOSIS:     ICD-10-CM ICD-9-CM   1. Moderate episode of recurrent major depressive disorder  F33.1 296.32   2. Anxiety  F41.9 300.00   3. Attention deficit hyperactivity disorder (ADHD), unspecified ADHD type  F90.9 314.01        Diagnoses and all orders for this visit:    1. Moderate episode of recurrent major depressive disorder (Primary)    2. Anxiety    3. Attention deficit hyperactivity disorder (ADHD), unspecified ADHD type           [facility] No Show Policy:  We understand unexpected circumstances arise; however, anytime you miss your appointment we are unable to provide you appropriate care.  In addition, each appointment missed could have been used to provide care for others.  We ask that you call at least 24 hours in advance to cancel or reschedule an appointment.  We would like to take this opportunity to remind you of our policy stating patients  who miss THREE or more appointments without cancelling or rescheduling 24 hours in advance of the appointment may be subject to cancellation of any further visits with our clinic and recommendation to seek in-person services/visits.    Please call [phone] to reschedule your appointment. If there are reasons that make it difficult for you to keep the appointments, please call and let us know how we can help.  Please understand that medication prescribing will not continue without seeing your provider.      [facility]'s No Show Policy reviewed with patient at today's visit. Patient verbalized understanding of this policy. Discussed with patient that in the event that there are three or more no show visits, it will be recommended that they pursue in-person services/visits as noncompliance with telehealth visits indicates that patient is not an appropriate candidate for telemedicine and would likely be more appropriate for in-person services/visits. Patient verbalizes understanding and is agreeable to this.       This document has been electronically signed by Polo Law III, LCSW  March 20, 2024 17:22 EDT      Part of this note may be an electronic transcription/translation of spoken language to printed text using the Dragon Dictation System.

## 2024-03-25 ENCOUNTER — OFFICE VISIT (OUTPATIENT)
Age: 24
End: 2024-03-25
Payer: COMMERCIAL

## 2024-03-25 VITALS
HEART RATE: 75 BPM | SYSTOLIC BLOOD PRESSURE: 122 MMHG | HEIGHT: 73 IN | BODY MASS INDEX: 30.11 KG/M2 | OXYGEN SATURATION: 98 % | WEIGHT: 227.2 LBS | DIASTOLIC BLOOD PRESSURE: 76 MMHG

## 2024-03-25 DIAGNOSIS — F41.1 GENERALIZED ANXIETY DISORDER: ICD-10-CM

## 2024-03-25 DIAGNOSIS — F33.1 MAJOR DEPRESSIVE DISORDER, RECURRENT EPISODE, MODERATE DEGREE: Primary | ICD-10-CM

## 2024-03-25 RX ORDER — DESVENLAFAXINE 100 MG/1
100 TABLET, EXTENDED RELEASE ORAL DAILY
Qty: 90 TABLET | Refills: 3 | Status: SHIPPED | OUTPATIENT
Start: 2024-03-25

## 2024-03-25 NOTE — PROGRESS NOTES
[facility]             Follow Up Office Visit      Date: 2023   Patient Name: Devon Carroll  : 2000   MRN: [id number]     Referring Provider: Anibal Leach MD    Chief Complaint:      ICD-10-CM ICD-9-CM   1. Major depressive disorder, recurrent episode, moderate degree  F33.1 296.32   2. Generalized anxiety disorder  F41.1 300.02     History of Present Illness:   Devon Carroll is a 23 y.o. male who is here today for follow up with MDD/ASHLEY.    Patient is seen and evaluated in the office.  He appears to be in no acute distress at this time.  He is calm and cooperative with evaluation, and exhibits a linear and goal-directed thought process.  Patient admits that things have still been getting progressively worse at work to the point where he has been applying for other jobs.  He is applying to jobs in other cities as he does not feel any real desire or need to stay in [place].  He is hoping to get somewhere closer to home, so that he can spend time with his family frequently.  He has applied for 3-4 positions, but has not heard back from anywhere yet.  His one biggest source of anxiety right now is that he has to figure out if he needs to be signed for his lease in .  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  We also spoke about relationships, and patient finding someone that he will enjoy doing things with.  He states that with all of his stressors going on currently he has been able to manage his mood fairly well.  He denies that he has felt as though he is slipping back to any depression.  He denies any significant issues with sleep or appetite.  He denies any suicidal ideation, plan, intent.  Patient is happy with medications as they are currently.  We will continue medications and follow-up in 6 months.    Previous Medication Trials:  Latuda (ineffective and caused weight gain), Strattera for ADHD: has tried others  "that didn't help     Subjective      Review of Systems:   Review of Systems   Constitutional:  Negative for chills, fatigue and fever.   HENT:  Negative for congestion, hearing loss, sore throat and trouble swallowing.    Eyes:  Negative for blurred vision and double vision.   Respiratory:  Negative for cough, chest tightness and shortness of breath.    Cardiovascular:  Negative for chest pain and palpitations.   Gastrointestinal:  Negative for abdominal pain, constipation, diarrhea, nausea and vomiting.   Endocrine: Negative for polydipsia and polyuria.   Genitourinary:  Negative for hematuria and urgency.   Musculoskeletal:  Negative for arthralgias.   Skin:  Negative for skin lesions and bruise.   Neurological:  Negative for dizziness, tremors, seizures and light-headedness.   Hematological:  Negative for adenopathy.     Screening Scores:   PHQ-9 : 4  ASHLEY-7 : 4    Medications:     Current Outpatient Medications:     desvenlafaxine (PRISTIQ) 100 MG 24 hr tablet, Take 1 tablet by mouth Daily., Disp: 90 tablet, Rfl: 3    Medication Considerations:  CONTRERAS reviewed and appropriate.     Allergies:   No Known Allergies    Objective     Vital Signs:   Vitals:    03/25/24 1122   BP: 122/76   Pulse: 75   SpO2: 98%   Weight: 103 kg (227 lb 3.2 oz)   Height: 186.1 cm (73.27\")     Body mass index is 29.76 kg/m².     Mental Status Exam:   MENTAL STATUS EXAM   General Appearance:  Cleanly groomed and dressed  Eye Contact:  Good eye contact  Attitude:  Cooperative  Motor Activity:  Normal gait, posture  Muscle Strength:  Normal  Speech:  Normal rate, tone, volume  Language:  Spontaneous  Mood and affect:  Normal, pleasant and appropriate  Hopelessness:  3  Thought Process:  Logical and goal-directed  Associations/ Thought Content:  No delusions  Hallucinations:  None  Suicidal Ideations:  Not present  Homicidal Ideation:  Not present  Sensorium:  Alert  Orientation:  Person, place, time and situation  Immediate Recall, Recent, " and Remote Memory:  Intact  Attention Span/ Concentration:  Good  Fund of Knowledge:  Appropriate for age and educational level  Intellectual Functioning:  Average range  Insight:  Fair  Judgement:  Fair  Reliability:  Fair  Impulse Control:  Fair      SUICIDE RISK ASSESSMENT/CSSRS:  1. Does patient have thoughts of suicide? denies  2. Does patient have intent for suicide? denies  3. Does patient have a current plan for suicide? denies  4. History of suicide attempts: denies  5. Family history of suicide or attempts: denies  6. History of violent behaviors towards others or property or thoughts of committing suicide: denies  7. History of sexual aggression toward others: denies  8. Access to firearms or weapons: denies    Assessment / Plan      Visit Diagnosis/Orders Placed This Visit:    1. Major depressive disorder, recurrent episode, moderate degree (Primary)  2. Generalized anxiety disorder  Continue Pristiq 100 mg po daily  Follow up with writer in 6 mo  Patient not interested in therapy at this time     Labs Reviewed : labs from 1/18/23 reviewed  Chart Reviewed      Functional Status: Mild impairment      Prognosis: Good with Ongoing Treatment     Impression/Formulation:  Patient appeared alert and oriented.  Patient is voluntarily requesting to begin outpatient therapy at [facility].  Patient is receptive to assistance with maintaining a stable lifestyle.  Patient presents with history of     ICD-10-CM ICD-9-CM   1. Major depressive disorder, recurrent episode, moderate degree  F33.1 296.32   2. Generalized anxiety disorder  F41.1 300.02       Treatment Plan:     Patient will continue supportive psychotherapy efforts and medications as indicated. Clinic will obtain release of information for current treatment team for continuity of care as needed. Patient will contact this office, call 911 or present to the nearest emergency room should suicidal or homicidal ideations occur.   Discussed medication options and treatment plan of prescribed medication(s) as well as the risks, benefits, and potential side effects. Patient ackowledged and verbally consented to continue with current treatment plan and was educated on the importance of compliance with treatment and follow-up appointments.     Quality Measures:  Tobacco: Patient denies use of tobacco and declines need for further education regarding use/cessation.     Follow Up:   Return in about 6 months (around 9/25/2024) for Medication Management.    Brynn Tao MD  [facility]     This is electronically signed by Brynn Tao MD   12/18/2023 11:56 EST

## 2024-04-04 ENCOUNTER — TELEMEDICINE (OUTPATIENT)
Dept: PSYCHIATRY | Facility: CLINIC | Age: 24
End: 2024-04-04
Payer: COMMERCIAL

## 2024-04-04 DIAGNOSIS — F41.9 ANXIETY: ICD-10-CM

## 2024-04-04 DIAGNOSIS — F33.1 MODERATE EPISODE OF RECURRENT MAJOR DEPRESSIVE DISORDER: Primary | ICD-10-CM
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
## 2024-04-17 ENCOUNTER — TELEMEDICINE (OUTPATIENT)
Dept: SLEEP MEDICINE | Facility: CLINIC | Age: 24
End: 2024-04-17
Payer: COMMERCIAL

## 2024-04-17 VITALS — WEIGHT: 220 LBS | BODY MASS INDEX: 27.35 KG/M2 | HEIGHT: 75 IN

## 2024-04-17 DIAGNOSIS — G47.33 OSA (OBSTRUCTIVE SLEEP APNEA): Primary | ICD-10-CM

## 2024-04-17 PROCEDURE — 99213 OFFICE O/P EST LOW 20 MIN: CPT | Performed by: NURSE PRACTITIONER

## 2024-04-17 NOTE — PROGRESS NOTES
Chief Complaint:   Chief Complaint   Patient presents with    Follow-up       HPI:    Devon Carroll is a 23 y.o. male here for follow-up of sleep apnea.  Patient had a sleep study 12/4/2023 that showed mild obstructive sleep apnea with an AHI of 11.4.  Patient due to his age and single status did not wish to try CPAP at that time.  He was interested in [facility]AD.  I did provide an order.  Patient states due to financial concerns he did not pursue this.  He did buy something off the Internet that keeps his tongue from falling to the back of the throat.  He does feel he is doing better with this appliance.  He only has slight snoring at this time.  He denies any gasping, witnessed apneas, or morning headaches.  He does not wish to retry a test at this time with this device in place.  He feels he is doing well.  He puts his [place] score at 1/24.  Patient is moving to [place] should he need us at any time before his move he will let us know.        Current medications are:   Current Outpatient Medications:     desvenlafaxine (PRISTIQ) 100 MG 24 hr tablet, Take 1 tablet by mouth Daily., Disp: 90 tablet, Rfl: 3.      The patient's relevant past medical, surgical, family and social history were reviewed and updated in Epic as appropriate.       Review of Systems   Respiratory:  Positive for apnea.    Psychiatric/Behavioral:  Positive for dysphoric mood and sleep disturbance. The patient is nervous/anxious.    All other systems reviewed and are negative.        Objective:    Physical Exam  Constitutional:       Appearance: Normal appearance.   HENT:      Head: Normocephalic and atraumatic.   Pulmonary:      Effort: Pulmonary effort is normal. No respiratory distress.   Neurological:      Mental Status: He is alert and oriented to person, place, and time.   Psychiatric:         Mood and Affect: Mood normal.         Behavior: Behavior normal.         Thought Content: Thought content normal.         Judgment: Judgment normal.              ASSESSMENT/PLAN    Diagnoses and all orders for this visit:    1. NANI (obstructive sleep apnea) (Primary)        Counseled patient regarding multimodal approach with healthy nutrition, healthy sleep, regular physical activity, social activities, counseling, and medications. Encouraged to practice lateral sleep position. Avoid alcohol and sedatives close to bedtime.  Patient will continue with the oral device he is wearing from the Internet he does not wish to be retested at this time with device in place patient is moving to [place] and will find a sleep doctor there.  He can follow-up with us as needed    The patient is located in [facility] at work. The patient presents today for telehealth service.  This service was conducted via audio/video technology through a secure [employer] video visit connection through [employer].  This provider is located in [facility].  Patient stated they [facility] at workin a secure environment for the session.  Patient's condition being diagnosed/treated is appropriate for telemedicine.  The provider identified himself as well as his credentials.  The patient, and/or patient's guardian, consent to be seen remotely, and when consent is given they understanding that the consent allows for patient identifiable information to be sent to a third-party as needed.  They may refuse to be seen remotely at any time.  The electronic data is encrypted and password protected, and the patient and/or guardian has been advised of the potential risk to privacy not withstanding such measures.  Patient identifiers used: Name and date of birth.   I have reviewed the results of my evaluation and impression and discussed my recommendations in detail with the patient.      Signed by  STANTON Shepherd    April 17, 2024      CC: Anibal Leach MD         No ref. provider found

## 2024-04-25 ENCOUNTER — TELEMEDICINE (OUTPATIENT)
Dept: PSYCHIATRY | Facility: CLINIC | Age: 24
End: 2024-04-25
Payer: COMMERCIAL

## 2024-04-25 DIAGNOSIS — F33.1 MODERATE EPISODE OF RECURRENT MAJOR DEPRESSIVE DISORDER: Primary | ICD-10-CM

## 2024-04-25 DIAGNOSIS — F41.9 ANXIETY: ICD-10-CM
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
## 2024-05-13 ENCOUNTER — TELEMEDICINE (OUTPATIENT)
Dept: PSYCHIATRY | Facility: CLINIC | Age: 24
End: 2024-05-13
Payer: COMMERCIAL

## 2024-05-13 DIAGNOSIS — F90.9 ATTENTION DEFICIT HYPERACTIVITY DISORDER (ADHD), UNSPECIFIED ADHD TYPE: ICD-10-CM

## 2024-05-13 DIAGNOSIS — F41.9 ANXIETY: ICD-10-CM

## 2024-05-13 DIAGNOSIS — F33.1 MODERATE EPISODE OF RECURRENT MAJOR DEPRESSIVE DISORDER: Primary | ICD-10-CM

## 2024-05-13 PROCEDURE — 90834 PSYTX W PT 45 MINUTES: CPT | Performed by: COUNSELOR

## 2024-05-13 NOTE — PROGRESS NOTES
Date: May 13, 2024  Time In: 4:30PM  Time Out: 5:15PM    Patient reports today will e last visit with virtual care provider do to moving back to [place] to live with parents and start new job in [place].      This provider is located at [place], [place] for [facility] (through [facility]), [address], [place] [address] using a secure Get[employer] Video Visit through [employer]. Patient is being seen remotely via telehealth at home address in [place] and stated they are in a secure environment for this session. The patient's condition being diagnosed/treated is appropriate for telemedicine. The provider identified himself as well as his credentials.. The patient, and/or patients guardian, consent to be seen remotely, and when consent is given they understand that the consent allows for patient identifiable information to be sent to a third party as needed. They may refuse to be seen remotely at any time. The electronic data is encrypted and password protected, and the patient and/or guardian has been advised of the potential risks to privacy not withstanding such measures.     You have chosen to receive care through a telehealth visit.  Do you consent to use a video/audio connection for your medical care today? Yes    PROGRESS NOTE  Data:  Devon Carroll is a 23 y.o. male who presents today for follow up    Chief Complaint: Anxiety and Depression     History of Present Illness: Patient reports fluctuating anxiety and depression through the week. Patient reports stress leading into resigning from employer and moving out of his apartment in [place], [place]. Patient reports the last two weeks were awkward at his job and he didn't really have much conversation with his boss. Patient identified feeling anxious today when he did not have to work. Patient reports today will e last visit with virtual care provider do to moving back to [place] to live with parents and start new job in  [place]. Patient reports feeling relieved and was able to reflect on progress made during therapy and development of coping strategies. Patient reports current medications appear to be working to reduce symptoms and stabilize mood. Patient will continue to work towards completing life goals goal setting, engaging in self awareness exercises, rejoining [employer] in [place], and finding ways to increase exercise and self-care.     Clinical Maneuvering/Intervention: CBT, MI, Patient Centered    (Scales based on 0 - 10 with 10 being the worst)  Depression: 3 Anxiety: 5       Assisted patient in processing above session content; acknowledged and normalized patient’s thoughts, feelings, and concerns.  Rationalized patient thought process regarding recent stressors and life events. Discussed triggers associated with patient's emotions. Also discussed coping skills for patient to implement. Discussed finally putting in resignation at employer and currently moving out of apartment in [place] back home to [place]. Discussed continued work towards utilizing coping skills, engaging in circular breathing and grounding techniques when feeling stressed or overwhelmed, engaging in positive thinking strategies, journaling thoughts and emotions, and finding ways to increase self care practices.     Allowed patient to freely discuss issues without interruption or judgment. Provided safe, confidential environment to facilitate the development of positive therapeutic relationship and encourage open, honest communication. Assisted patient in identifying risk factors which would indicate the need for higher level of care including thoughts to harm self or others and/or self-harming behavior and encouraged patient to contact this office, call 911, or present to the nearest emergency room should any of these events occur. Discussed crisis intervention services and means to access. Patient adamantly and convincingly denies  current suicidal or homicidal ideation or perceptual disturbance.    Assessment:   Assessment   Patient appears to maintain relative stability as compared to their baseline.  However, patient continues to struggle with anxiety and depression which continues to cause impairment in important areas of functioning.  A result, they can be reasonably expected to continue to benefit from treatment and would likely be at increased risk for decompensation otherwise.    Mental Status Exam:   Hygiene:   good  Cooperation:  Cooperative  Eye Contact:  Good  Psychomotor Behavior:  Appropriate  Affect:  Full range  Mood: fluctates  Speech:  Normal  Thought Process:  Linear  Thought Content:  Mood congruent  Suicidal:  None  Homicidal:  None  Hallucinations:  None  Delusion:  None  Memory:  Intact  Orientation:  Person, Place, Time and Situation  Reliability:  fair  Insight:  Fair  Judgement:  Fair  Impulse Control:  Fair  Physical/Medical Issues:  No        Patient's Support Network Includes:  parents and extended family    Functional Status:  Mild to moderate  impairment     Progress toward goal: Patient is making progress on processing thoughts and emotions during sessions, journaling, utilizing positive thinking strategies, engaging in breathing and grounding skills, daily positive affirmations and self care practices.     Prognosis: Good with Ongoing Treatment            Plan:    Patient will continue in individual outpatient therapy with focus on improved functioning and coping skills, maintaining stability, and avoiding decompensation and the need for higher level of care.    Patient will adhere to medication regimen as prescribed and report any side effects. Patient will contact this office, call 911 or present to the nearest emergency room should suicidal or homicidal ideations occur. Provide Cognitive Behavioral Therapy and Solution Focused Therapy to improve functioning, maintain stability, and avoid decompensation and  the need for higher level of care.     Patient reports today will e last visit with [facility] provider do to moving back to [place] to live with parents and start new job in [place].            VISIT DIAGNOSIS:     ICD-10-CM ICD-9-CM   1. Moderate episode of recurrent major depressive disorder  F33.1 296.32   2. Anxiety  F41.9 300.00   3. Attention deficit hyperactivity disorder (ADHD), unspecified ADHD type  F90.9 314.01        Diagnoses and all orders for this visit:    1. Moderate episode of recurrent major depressive disorder (Primary)    2. Anxiety    3. Attention deficit hyperactivity disorder (ADHD), unspecified ADHD type           [facility] No Show Policy:  We understand unexpected circumstances arise; however, anytime you miss your appointment we are unable to provide you appropriate care.  In addition, each appointment missed could have been used to provide care for others.  We ask that you call at least 24 hours in advance to cancel or reschedule an appointment.  We would like to take this opportunity to remind you of our policy stating patients who miss THREE or more appointments without cancelling or rescheduling 24 hours in advance of the appointment may be subject to cancellation of any further visits with our clinic and recommendation to seek in-person services/visits.    Please call [phone] to reschedule your appointment. If there are reasons that make it difficult for you to keep the appointments, please call and let us know how we can help.  Please understand that medication prescribing will not continue without seeing your provider.      [facility]'s No Show Policy reviewed with patient at today's visit. Patient verbalized understanding of this policy. Discussed with patient that in the event that there are three or more no show visits, it will be recommended that they pursue in-person services/visits as noncompliance with telehealth visits  indicates that patient is not an appropriate candidate for telemedicine and would likely be more appropriate for in-person services/visits. Patient verbalizes understanding and is agreeable to this.       This document has been electronically signed by Polo Law III, LCSW  May 13, 2024 17:08 EDT      Part of this note may be an electronic transcription/translation of spoken language to printed text using the Dragon Dictation System.